# Patient Record
Sex: FEMALE | Race: WHITE | NOT HISPANIC OR LATINO | Employment: OTHER | URBAN - METROPOLITAN AREA
[De-identification: names, ages, dates, MRNs, and addresses within clinical notes are randomized per-mention and may not be internally consistent; named-entity substitution may affect disease eponyms.]

---

## 2017-01-19 ENCOUNTER — TELEPHONE (OUTPATIENT)
Dept: NURSING | Facility: CLINIC | Age: 77
End: 2017-01-19

## 2017-01-20 NOTE — TELEPHONE ENCOUNTER
"Call Type: Triage Call    Presenting Problem: \"I am having a fast heart rate . For the past 20  minutes it's running 145-148. /68. I recently had a work up  with a cardiologist because I had an abnormal EKG prior to a pre op.  But he said everything was fine .But it's beating  irregular and  fast right now.\" Patient denies other sx at this time. Triaged and  advised ER. Patient agrees.  Triage Note:  Guideline Title: Irregular Heartbeat  Recommended Disposition: See ED Immediately  Original Inclination: Wanted to speak with a nurse  Override Disposition:  Intended Action: Follow advice given  Physician Contacted: No  New onset of rapid pulse (greater than 120 beats/minute at rest) OR slow pulse  (less than 50 beats per minute at rest) ?  YES  Loss of consciousness for any period of time ? NO  New or worsening signs and symptoms that may indicate shock ? NO  Signs of dehydration and pulse rate at rest greater than 100 beats/minute ? NO  Currently having palpitations/irregular heartbeats AND severe breathing problems ?  NO  Has a pacemaker AND new symptoms of decreased cardiac output (episode of feeling  faint, dizzy or fatigue, sudden slowing of pulse, or shortness of breath) ? NO  Any other cardiac signs/symptoms for more than 5 minutes, now or within last hour.  Pain is NOT associated with taking a deep breath or a productive cough, movement,  or touch to a localized area on the chest or upper body. ? NO  Sudden onset of rapid pulse (greater than 120 beats/minute at rest) OR slow pulse  (less than 50 beats per minute at rest) with heat exposure (high temperature,  high humidity, strenuous exercise) ? NO  Physician Instructions:  Care Advice: Another adult should drive.  IMMEDIATE ACTION  "

## 2017-01-31 ENCOUNTER — DOCUMENTATION ONLY (OUTPATIENT)
Dept: OTHER | Facility: CLINIC | Age: 77
End: 2017-01-31

## 2017-01-31 DIAGNOSIS — Z71.89 ADVANCE CARE PLANNING: Primary | Chronic | ICD-10-CM

## 2018-05-22 ENCOUNTER — NURSE TRIAGE (OUTPATIENT)
Dept: NURSING | Facility: CLINIC | Age: 78
End: 2018-05-22

## 2018-05-23 NOTE — TELEPHONE ENCOUNTER
Patient reports that she is having stabbing chest pain under her left breast. Said the pain has been present for an hour. Denies feeling faint or dizzy. Cries out in pain during the triage call.     Protocol reviewed.   Caller states understanding of the recommended disposition.    Delfina Sethi RN/DONTE    Reason for Disposition    [1] Chest pain lasts > 5 minutes AND [2] age > 50    Additional Information    Negative: Severe difficulty breathing (e.g., struggling for each breath, speaks in single words)    Negative: Difficult to awaken or acting confused (e.g., disoriented, slurred speech)    Negative: Shock suspected (e.g., cold/pale/clammy skin, too weak to stand, low BP, rapid pulse)    Negative: [1] Chest pain lasts > 5 minutes AND [2] history of heart disease  (i.e., heart attack, bypass surgery, angina, angioplasty, CHF; not just a heart murmur)    Negative: [1] Chest pain lasts > 5 minutes AND [2] described as crushing, pressure-like, or heavy    Protocols used: CHEST PAIN-ADULT-

## 2018-10-18 RX ORDER — BUDESONIDE AND FORMOTEROL FUMARATE DIHYDRATE 80; 4.5 UG/1; UG/1
2 AEROSOL RESPIRATORY (INHALATION) DAILY
COMMUNITY
End: 2020-08-21

## 2018-10-18 RX ORDER — ALBUTEROL SULFATE 90 UG/1
2 AEROSOL, METERED RESPIRATORY (INHALATION) EVERY 6 HOURS PRN
COMMUNITY

## 2018-10-18 RX ORDER — ESCITALOPRAM OXALATE 10 MG/1
10 TABLET ORAL DAILY
COMMUNITY

## 2018-10-19 ENCOUNTER — HOSPITAL ENCOUNTER (OUTPATIENT)
Facility: CLINIC | Age: 78
Discharge: HOME | End: 2018-10-19
Attending: OPHTHALMOLOGY | Admitting: OPHTHALMOLOGY
Payer: MEDICARE

## 2018-10-19 ENCOUNTER — SURGERY (OUTPATIENT)
Age: 78
End: 2018-10-19

## 2018-10-19 ENCOUNTER — ANESTHESIA (OUTPATIENT)
Dept: SURGERY | Facility: CLINIC | Age: 78
End: 2018-10-19
Payer: MEDICARE

## 2018-10-19 ENCOUNTER — ANESTHESIA EVENT (OUTPATIENT)
Dept: SURGERY | Facility: CLINIC | Age: 78
End: 2018-10-19
Payer: MEDICARE

## 2018-10-19 VITALS
RESPIRATION RATE: 16 BRPM | OXYGEN SATURATION: 94 % | SYSTOLIC BLOOD PRESSURE: 113 MMHG | HEIGHT: 67 IN | BODY MASS INDEX: 25.74 KG/M2 | WEIGHT: 164 LBS | TEMPERATURE: 97 F | DIASTOLIC BLOOD PRESSURE: 58 MMHG

## 2018-10-19 PROCEDURE — 25000125 ZZHC RX 250: Performed by: ANESTHESIOLOGY

## 2018-10-19 PROCEDURE — 36000056 ZZH SURGERY LEVEL 3 1ST 30 MIN: Performed by: OPHTHALMOLOGY

## 2018-10-19 PROCEDURE — 25000132 ZZH RX MED GY IP 250 OP 250 PS 637: Mod: GY | Performed by: OPHTHALMOLOGY

## 2018-10-19 PROCEDURE — 37000008 ZZH ANESTHESIA TECHNICAL FEE, 1ST 30 MIN: Performed by: OPHTHALMOLOGY

## 2018-10-19 PROCEDURE — 71000012 ZZH RECOVERY PHASE 1 LEVEL 1 FIRST HR: Performed by: OPHTHALMOLOGY

## 2018-10-19 PROCEDURE — 27210794 ZZH OR GENERAL SUPPLY STERILE: Performed by: OPHTHALMOLOGY

## 2018-10-19 PROCEDURE — 25000125 ZZHC RX 250: Performed by: NURSE ANESTHETIST, CERTIFIED REGISTERED

## 2018-10-19 PROCEDURE — 25000125 ZZHC RX 250: Performed by: OPHTHALMOLOGY

## 2018-10-19 PROCEDURE — 25000128 H RX IP 250 OP 636: Performed by: NURSE ANESTHETIST, CERTIFIED REGISTERED

## 2018-10-19 PROCEDURE — 40000170 ZZH STATISTIC PRE-PROCEDURE ASSESSMENT II: Performed by: OPHTHALMOLOGY

## 2018-10-19 PROCEDURE — 37000009 ZZH ANESTHESIA TECHNICAL FEE, EACH ADDTL 15 MIN: Performed by: OPHTHALMOLOGY

## 2018-10-19 PROCEDURE — 25000128 H RX IP 250 OP 636: Performed by: ANESTHESIOLOGY

## 2018-10-19 PROCEDURE — 71000027 ZZH RECOVERY PHASE 2 EACH 15 MINS: Performed by: OPHTHALMOLOGY

## 2018-10-19 PROCEDURE — 36000058 ZZH SURGERY LEVEL 3 EA 15 ADDTL MIN: Performed by: OPHTHALMOLOGY

## 2018-10-19 PROCEDURE — A9270 NON-COVERED ITEM OR SERVICE: HCPCS | Mod: GY | Performed by: OPHTHALMOLOGY

## 2018-10-19 RX ORDER — HYDROMORPHONE HYDROCHLORIDE 1 MG/ML
.3-.5 INJECTION, SOLUTION INTRAMUSCULAR; INTRAVENOUS; SUBCUTANEOUS EVERY 5 MIN PRN
Status: DISCONTINUED | OUTPATIENT
Start: 2018-10-19 | End: 2018-10-19 | Stop reason: HOSPADM

## 2018-10-19 RX ORDER — ONDANSETRON 2 MG/ML
INJECTION INTRAMUSCULAR; INTRAVENOUS PRN
Status: DISCONTINUED | OUTPATIENT
Start: 2018-10-19 | End: 2018-10-19

## 2018-10-19 RX ORDER — FENTANYL CITRATE 50 UG/ML
INJECTION, SOLUTION INTRAMUSCULAR; INTRAVENOUS PRN
Status: DISCONTINUED | OUTPATIENT
Start: 2018-10-19 | End: 2018-10-19

## 2018-10-19 RX ORDER — FENTANYL CITRATE 50 UG/ML
25-50 INJECTION, SOLUTION INTRAMUSCULAR; INTRAVENOUS
Status: DISCONTINUED | OUTPATIENT
Start: 2018-10-19 | End: 2018-10-19 | Stop reason: HOSPADM

## 2018-10-19 RX ORDER — ONDANSETRON 4 MG/1
4 TABLET, ORALLY DISINTEGRATING ORAL EVERY 30 MIN PRN
Status: DISCONTINUED | OUTPATIENT
Start: 2018-10-19 | End: 2018-10-19 | Stop reason: HOSPADM

## 2018-10-19 RX ORDER — SODIUM CHLORIDE, SODIUM LACTATE, POTASSIUM CHLORIDE, CALCIUM CHLORIDE 600; 310; 30; 20 MG/100ML; MG/100ML; MG/100ML; MG/100ML
INJECTION, SOLUTION INTRAVENOUS CONTINUOUS
Status: DISCONTINUED | OUTPATIENT
Start: 2018-10-19 | End: 2018-10-19 | Stop reason: HOSPADM

## 2018-10-19 RX ORDER — ONDANSETRON 2 MG/ML
4 INJECTION INTRAMUSCULAR; INTRAVENOUS EVERY 30 MIN PRN
Status: DISCONTINUED | OUTPATIENT
Start: 2018-10-19 | End: 2018-10-19 | Stop reason: HOSPADM

## 2018-10-19 RX ORDER — PROPOFOL 10 MG/ML
INJECTION, EMULSION INTRAVENOUS PRN
Status: DISCONTINUED | OUTPATIENT
Start: 2018-10-19 | End: 2018-10-19

## 2018-10-19 RX ORDER — HYDROCODONE BITARTRATE AND ACETAMINOPHEN 5; 325 MG/1; MG/1
1 TABLET ORAL ONCE
Status: COMPLETED | OUTPATIENT
Start: 2018-10-19 | End: 2018-10-19

## 2018-10-19 RX ORDER — NALOXONE HYDROCHLORIDE 0.4 MG/ML
.1-.4 INJECTION, SOLUTION INTRAMUSCULAR; INTRAVENOUS; SUBCUTANEOUS
Status: DISCONTINUED | OUTPATIENT
Start: 2018-10-19 | End: 2018-10-19 | Stop reason: HOSPADM

## 2018-10-19 RX ORDER — LIDOCAINE HCL/EPINEPHRINE/PF 2%-1:200K
VIAL (ML) INJECTION PRN
Status: DISCONTINUED | OUTPATIENT
Start: 2018-10-19 | End: 2018-10-19 | Stop reason: HOSPADM

## 2018-10-19 RX ORDER — LIDOCAINE HYDROCHLORIDE 20 MG/ML
INJECTION, SOLUTION INFILTRATION; PERINEURAL PRN
Status: DISCONTINUED | OUTPATIENT
Start: 2018-10-19 | End: 2018-10-19

## 2018-10-19 RX ORDER — ERYTHROMYCIN 5 MG/G
OINTMENT OPHTHALMIC PRN
Status: DISCONTINUED | OUTPATIENT
Start: 2018-10-19 | End: 2018-10-19 | Stop reason: HOSPADM

## 2018-10-19 RX ADMIN — LIDOCAINE HYDROCHLORIDE 80 MG: 20 INJECTION, SOLUTION INFILTRATION; PERINEURAL at 10:48

## 2018-10-19 RX ADMIN — MIDAZOLAM 1 MG: 1 INJECTION INTRAMUSCULAR; INTRAVENOUS at 10:45

## 2018-10-19 RX ADMIN — LIDOCAINE HYDROCHLORIDE 1 ML: 10 INJECTION, SOLUTION EPIDURAL; INFILTRATION; INTRACAUDAL; PERINEURAL at 09:38

## 2018-10-19 RX ADMIN — LIDOCAINE HYDROCHLORIDE,EPINEPHRINE BITARTRATE 5 ML: 20; .005 INJECTION, SOLUTION EPIDURAL; INFILTRATION; INTRACAUDAL; PERINEURAL at 11:00

## 2018-10-19 RX ADMIN — ONDANSETRON 4 MG: 2 INJECTION INTRAMUSCULAR; INTRAVENOUS at 10:59

## 2018-10-19 RX ADMIN — PROPOFOL 10 MG: 10 INJECTION, EMULSION INTRAVENOUS at 10:51

## 2018-10-19 RX ADMIN — PROPOFOL 30 MG: 10 INJECTION, EMULSION INTRAVENOUS at 10:48

## 2018-10-19 RX ADMIN — PROPOFOL 10 MG: 10 INJECTION, EMULSION INTRAVENOUS at 10:50

## 2018-10-19 RX ADMIN — FENTANYL CITRATE 50 MCG: 50 INJECTION, SOLUTION INTRAMUSCULAR; INTRAVENOUS at 10:46

## 2018-10-19 RX ADMIN — FENTANYL CITRATE 50 MCG: 50 INJECTION, SOLUTION INTRAMUSCULAR; INTRAVENOUS at 12:10

## 2018-10-19 RX ADMIN — PROPOFOL 10 MG: 10 INJECTION, EMULSION INTRAVENOUS at 10:49

## 2018-10-19 RX ADMIN — ERYTHROMYCIN 2 G: 5 OINTMENT OPHTHALMIC at 11:00

## 2018-10-19 RX ADMIN — MIDAZOLAM 1 MG: 1 INJECTION INTRAMUSCULAR; INTRAVENOUS at 10:51

## 2018-10-19 RX ADMIN — SODIUM CHLORIDE, POTASSIUM CHLORIDE, SODIUM LACTATE AND CALCIUM CHLORIDE: 600; 310; 30; 20 INJECTION, SOLUTION INTRAVENOUS at 10:45

## 2018-10-19 RX ADMIN — FENTANYL CITRATE 25 MCG: 50 INJECTION, SOLUTION INTRAMUSCULAR; INTRAVENOUS at 11:41

## 2018-10-19 RX ADMIN — HYDROCODONE BITARTRATE AND ACETAMINOPHEN 1 TABLET: 5; 325 TABLET ORAL at 13:51

## 2018-10-19 RX ADMIN — LIDOCAINE HYDROCHLORIDE,EPINEPHRINE BITARTRATE 5 ML: 20; .005 INJECTION, SOLUTION EPIDURAL; INFILTRATION; INTRACAUDAL; PERINEURAL at 11:01

## 2018-10-19 RX ADMIN — DEXMEDETOMIDINE HYDROCHLORIDE 8 MCG: 100 INJECTION, SOLUTION INTRAVENOUS at 10:58

## 2018-10-19 RX ADMIN — DEXMEDETOMIDINE HYDROCHLORIDE 8 MCG: 100 INJECTION, SOLUTION INTRAVENOUS at 11:08

## 2018-10-19 ASSESSMENT — LIFESTYLE VARIABLES: TOBACCO_USE: 1

## 2018-10-19 ASSESSMENT — ENCOUNTER SYMPTOMS: SEIZURES: 0

## 2018-10-19 ASSESSMENT — COPD QUESTIONNAIRES: COPD: 1

## 2018-10-19 NOTE — DISCHARGE INSTRUCTIONS
Same Day Surgery Discharge Instructions for  Sedation and General Anesthesia       It's not unusual to feel dizzy, light-headed or faint for up to 24 hours after surgery or while taking pain medication.  If you have these symptoms: sit for a few minutes before standing and have someone assist you when you get up to walk or use the bathroom.      You should rest and relax for the next 24 hours. We recommend you make arrangements to have an adult stay with you for at least 24 hours after your discharge.  Avoid hazardous and strenuous activity.      DO NOT DRIVE any vehicle or operate mechanical equipment for 24 hours following the end of your surgery.  Even though you may feel normal, your reactions may be affected by the medication you have received.      Do not drink alcoholic beverages for 24 hours following surgery.       Slowly progress to your regular diet as you feel able. It's not unusual to feel nauseated and/or vomit after receiving anesthesia.  If you develop these symptoms, drink clear liquids (apple juice, ginger ale, broth, 7-up, etc. ) until you feel better.  If your nausea and vomiting persists for 24 hours, please notify your surgeon.        All narcotic pain medications, along with inactivity and anesthesia, can cause constipation. Drinking plenty of liquids and increasing fiber intake will help.      For any questions of a medical nature, call your surgeon.      Do not make important decisions for 24 hours.      If you had general anesthesia, you may have a sore throat for a couple of days related to the breathing tube used during surgery.  You may use Cepacol lozenges to help with this discomfort.  If it worsens or if you develop a fever, contact your surgeon.       If you feel your pain is not well managed with the pain medications prescribed by your surgeon, please contact your surgeon's office to let them know so they can address your concerns.     [facility]   Eyelid/Orbital  Surgery Discharge Instructions   Eric Spears M.D..     ICE COMPRESSES  Immediately following surgery, you should begin to apply ice compresses.  Apply a cold gel pack or wrap a clean washcloth around a cup of crushed ice in a plastic bag (a bag of frozen peas also works well) and hold the cold compresses directly against the closed eyelid (s).Apply cold pack for a minimum of six times daily for no longer than 15 minutes at a time. Continue cold compresses every day until the bruising and swelling begin to subside.  This can vary for each patient, but three 3 days may be common.    HOT COMPRESSES  After your swelling and bruising have begun to subside, hot compresses should be applied.  Take a clean washcloth and wring it out in hot water (as warm as you can tolerate comfortably).  Hold this warm compress against the closed eyelid(s) at least six times per day for 15 minutes.  This should be continued for about two weeks.    OINTMENT  You may be given some ointment when you leave the hospital.  Apply this ointment to the suture line(s) twice a day, 1/4 inch into the eye at bedtime for 7 days.  Expect some blurring of vision from the ointment.     ACTIVITY  Avoid heavy lifting or vigorous exercise for one week after surgery.  You may resume regular activities as tolerated.  You may shower and wash your hair on the day after surgery; be careful to avoid getting shampoo in your eyes. While your eyes are still swelling, it is recommended you sleep on your back and elevate your head with 2-3 pillows.    MEDICATION  If the doctor has given you some medications to take after surgery, please take these according to the instructions on the bottle.  Pain medications may make you drowsy so do not drive, operate heavy machinery, or use alcohol while taking it.  When you feel that you do not need the prescription pain medication, you may substitute Extra Strength Tylenol for mild pain by also following the directions on the  bottle.    If you were taking Coumadin (warfarin) prior to your surgery, you may resume this medication with your next scheduled dose.    WHAT TO EXPECT  You should expect some slight oozing of blood from the incision site over the first two to three days after surgery.  Swelling and bruising will occur for one to two weeks or longer.  You may also experience itching and tearing during the first several weeks after surgery.  This is part of the normal healing process    QUESTIONS  Please feel free to contact the office, should you have any questions that are not answered above.  The phone number is [phone].  Please call immediately if you are unable to establish vision in the operative eye, you are experiencing heavy bleeding that will not stop with gentle pressure or you have any signs of an infection (greenish/yellow discharge or progressive redness).    [facility]  [facility] [place] [address]  [place], [place] [address]  [phone]    Dr. Spears has prescribed Norco/Vicodin for pain for you. It's a combination medication of Tylenol 325 mg and Hydrocodone 5 mg.  There is a limit of 4000 mg of Tylenol per 24 hours.       You also were prescribed Erythromycin ointment.  Apply per pharmacy label instructions.

## 2018-10-19 NOTE — OP NOTE
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 Patient also has excessive skin overhanging the upper   eyelids contributing the visual obstruction.    Procedure: The patient was taken to the operating room and the upper eyelid creases were marked with a marking pen. The upper eyelids were injected with 2% Lidocaine  along both upper eyelid creases. The patient was prepped and draped in the usual sterile fashion. The planned skin incision was outlined with a fine tipped marking pen.  The incision was then made along the previously marked area. A moderate amount of skin was excised taking care to allow adequate closure and avoid lagophthalmos. Cb scissors were then used to develop a plane over the septum. The septum was opened and a small amount of orbital fat was removed. Levator aponeurosis was then disinserted from the tarsus and a plane was developed between th aponeurosis and the underlying tarsus and watters's muscle. A 5-0 Mersilene suture was then passed through the tarsus in a lamellar fashion and externalized through the levator aponeurosis. This was tied off in a temporary fashion and the patient was asked to sit up and the eyelids height and contour evaluated. This was repeated until a satisfactory height and contour were obtained,and two additional sutures were placed lateral to the initial suture.This resulted in a normal contour and height to the upper eyelids. Attempted overcorrection of 0.5mm was obtained. The redundant levator aponeurosis was then excised and the skin was then closed with running 6-0 fast absorbing suture. The patient left the operating  room in stable condition.

## 2018-10-19 NOTE — ANESTHESIA PREPROCEDURE EVALUATION
Procedure: Procedure(s):  BILATERAL UPPER LID PTOSIS AND MECHANICAL PTOSIS REPAIRS  Preop diagnosis: BILATERAL UPPER LID HERMATOCHALASIS AND MECHANICAL PTOSIS REPAIR     Allergies   Allergen Reactions     Yellow Jacket Venom [Venomil Honey Bee] Anaphylaxis     Sulfa Drugs Hives     Vancomycin Hives       Past Medical History:   Diagnosis Date     Allergic rhinitis due to allergen      Arrhythmia     tachyarrythmia     Arthritis      Asthma     states coughs alot and has been told at one time had asthma and used inhaler     Bladder dysfunction      Bladder pain      COPD (chronic obstructive pulmonary disease) (H)     mild     External hemorrhoids      Fibromyalgia      Gastro-oesophageal reflux disease      Hiatal hernia      Hiatal hernia      Lumbar radiculopathy, right      Multiple pulmonary nodules      Osteopenia      Pain      PONV (postoperative nausea and vomiting)      Ptosis      Pulmonary nodules      Urticaria      Urticaria        Past Surgical History:   Procedure Laterality Date     APPENDECTOMY       BACK SURGERY      cervical discetomy     BREAST SURGERY       C DIAG IMP MAMMO DIG BILAT, INCL CAD WHEN PERF       COLONOSCOPY       COSMETIC SURGERY  1975    breast implants     CYSTOSCOPY       CYSTOSCOPY, BIOPSY BLADDER, COMBINED  1/24/2013    Procedure: COMBINED CYSTOSCOPY, BIOPSY BLADDER;  cystoscopy, bladder biopsy and fulgaration;  Surgeon: Araceli Vega MD;  Location:  OR     CYSTOSCOPY, FULGURATE BLADDER TUMOR, COMBINED N/A 11/2/2016    Procedure: COMBINED CYSTOSCOPY, FULGURATE BLADDER TUMOR;  Surgeon: Araceli Vega MD;  Location:  OR     CYSTOSCOPY, RETROGRADES, COMBINED  1/24/2013    Procedure: COMBINED CYSTOSCOPY, RETROGRADES;  CYSTOSCOPY, BILATERAL RETROGRADES, BLADDER BIOPSY, FULGURATION ;  Surgeon: Araceli Vega MD;  Location:  OR     EYE SURGERY       TONSILLECTOMY & ADENOIDECTOMY       TUBAL LIGATION         Social History   Substance Use Topics     Smoking  status: Former Smoker     Quit date: 1/1/1973     Smokeless tobacco: Never Used     Alcohol use Yes      Comment: occasionally       Prior to Admission medications    Medication Sig Start Date End Date Taking? Authorizing Provider   albuterol (PROAIR HFA/PROVENTIL HFA/VENTOLIN HFA) 108 (90 Base) MCG/ACT inhaler Inhale 2 puffs into the lungs every 6 hours   Yes Reported, Patient   ASPIRIN PO Take 81 mg by mouth daily   Yes Reported, Patient   ATORVASTATIN CALCIUM PO Take 5 mg by mouth At Bedtime   Yes Reported, Patient   budesonide-formoterol (SYMBICORT) 80-4.5 MCG/ACT Inhaler Inhale 2 puffs into the lungs daily   Yes Reported, Patient   ESCITALOPRAM OXALATE PO Take 10 mg by mouth daily   Yes Reported, Patient   VERAPAMIL HCL PO Take 120 mg by mouth daily   Yes Reported, Patient   ALENDRONATE SODIUM PO Take 70 mg by mouth once a week    Reported, Patient   Calcium Citrate-Vitamin D (CALCIUM + D PO) Take 1 tablet by mouth daily    Reported, Patient   EPINEPHrine 0.3 MG/0.3ML injection 2-pack Inject 0.3 mg into the muscle as needed for anaphylaxis    Reported, Patient   fluticasone (VERAMYST) 27.5 MCG/SPRAY nasal spray Spray 2 sprays into both nostrils daily.    Reported, Patient   Multiple Vitamins-Minerals (MULTIVITAMIN ADULT PO) Take 1 tablet by mouth daily    Reported, Patient   OMEPRAZOLE PO Take 20 mg by mouth every morning.    Reported, Patient   pentosan polysulfate (ELMIRON) 100 MG capsule Take 100 mg by mouth 2 times daily     Reported, Patient   TRAZODONE HCL PO Take 50 mg by mouth At Bedtime    Reported, Patient   VITAMIN D, CHOLECALCIFEROL, PO Take 1,000 Units by mouth daily    Reported, Patient     No current Epic-ordered facility-administered medications on file.      No current [facility]-ordered outpatient prescriptions on file.         Wt Readings from Last 1 Encounters:   10/19/18 74.4 kg (164 lb)     Temp Readings from Last 1 Encounters:   10/19/18 36.1  C (97  F) (Oral)     BP Readings from Last 6  Encounters:   10/19/18 110/54   11/02/16 108/61   07/22/15 119/71   01/24/13 91/54     Pulse Readings from Last 4 Encounters:   07/22/15 66     Resp Readings from Last 1 Encounters:   10/19/18 16     SpO2 Readings from Last 1 Encounters:   10/19/18 95%     Recent Labs   Lab Test  07/22/15   1250   NA  138   POTASSIUM  3.9   CHLORIDE  104   CO2  28   ANIONGAP  6   GLC  102*   BUN  8   CR  0.64   BROOK  9.3     No results for input(s): AST, ALT, ALKPHOS, BILITOTAL, LIPASE in the last 28408 hours.  Recent Labs   Lab Test  07/22/15   1250   WBC  4.8   HGB  12.5   PLT  124*     No results for input(s): ABO, RH in the last 86412 hours.  No results for input(s): INR, PTT in the last 47304 hours.   No results for input(s): TROPI in the last 28105 hours.  No results for input(s): PH, PCO2, PO2, HCO3 in the last 31705 hours.  No results for input(s): HCG in the last 92200 hours.    No results found for this or any previous visit (from the past 744 hour(s)).    Anesthesia Evaluation     . Pt has had prior anesthetic.     History of anesthetic complications   - PONV        ROS/MED HX    ENT/Pulmonary:     (+)allergic rhinitis, tobacco use, Past use asthma Treatment: Inhaler daily and Inhaled steroids,  COPD, , . .   (-) sleep apnea   Neurologic:      (-) seizures, CVA and migraines   Cardiovascular:     (+) Dyslipidemia, ----. : . . . :. Irregular Heartbeat/Palpitations (Paroxysmal SVT vs a-fib per pt description; episodes of tachy to the 160's; happens approx 1x per year; controlled with verapamil), .      (-) hypertension and stent   METS/Exercise Tolerance:  >4 METS   Hematologic:        (-) history of blood clots and anemia   Musculoskeletal:        (-) arthritis   GI/Hepatic:     (+) GERD Asymptomatic on medication, hiatal hernia, hepatitis (In early adulthood; resolved with no issues) type Other,       Renal/Genitourinary:     (+) Other Renal/ Genitourinary, bladder pain   (-) renal disease   Endo:      (-) Type I DM, Type  II DM and thyroid disease   Psychiatric:         Infectious Disease:        (-) Recent Fever   Malignancy:         Other:                     Physical Exam  Normal systems: cardiovascular and pulmonary    Airway   Mallampati: II  TM distance: >3 FB  Neck ROM: full    Dental   Comment: Denies loose, damaged or chipped teeth    Cardiovascular   Rhythm and rate: regular and normal      Pulmonary                     Anesthesia Plan      History & Physical Review  History and physical reviewed and following examination; no interval change.    ASA Status:  2 .    NPO Status:  > 8 hours    Plan for MAC Reason for MAC:  Deep or markedly invasive procedure (G8)  PONV prophylaxis:  Ondansetron (or other 5HT-3)       Postoperative Care  Postoperative pain management:  IV analgesics and Oral pain medications.      Consents  Anesthetic plan, risks, benefits and alternatives discussed with:  Patient..                          .

## 2018-10-19 NOTE — IP AVS SNAPSHOT
[facility] Day [facility]    [address]    [place] [address]    Phone:  [phone]    Fax:  [phone]                                       After Visit Summary   10/19/2018    Suzanne Henderson    MRN: [id number]           After Visit Summary Signature Page     I have received my discharge instructions, and my questions have been answered. I have discussed any challenges I see with this plan with the nurse or doctor.    ..........................................................................................................................................  Patient/Patient Representative Signature      ..........................................................................................................................................  Patient Representative Print Name and Relationship to Patient    ..................................................               ................................................  Date                                   Time    ..........................................................................................................................................  Reviewed by Signature/Title    ...................................................              ..............................................  Date                                               Time          22EPIC Rev 08/18

## 2018-10-19 NOTE — ANESTHESIA POSTPROCEDURE EVALUATION
Patient: Suzanne Henderson    Procedure(s):  BILATERAL UPPER LID PTOSIS AND MECHANICAL PTOSIS REPAIRS    Diagnosis:BILATERAL UPPER LID HERMATOCHALASIS AND MECHANICAL PTOSIS REPAIR   Diagnosis Additional Information: No value filed.    Anesthesia Type:  MAC    Note:  Anesthesia Post Evaluation    Patient location during evaluation: PACU  Patient participation: Able to fully participate in evaluation  Level of consciousness: awake  Pain management: adequate  Airway patency: patent  Cardiovascular status: acceptable  Respiratory status: acceptable  Hydration status: acceptable  PONV: none             Last vitals:  Vitals:    10/19/18 0839 10/19/18 0840 10/19/18 1206   BP: 110/54  93/49   Resp: 16  16   Temp: 36.1  C (97  F)     SpO2:  95% 93%         Electronically Signed By: Uriel Schultz MD  October 19, 2018  12:15 PM

## 2018-10-19 NOTE — ANESTHESIA CARE TRANSFER NOTE
Patient: Suzanne Henderson    Procedure(s):  BILATERAL UPPER LID PTOSIS AND MECHANICAL PTOSIS REPAIRS    Diagnosis: BILATERAL UPPER LID HERMATOCHALASIS AND MECHANICAL PTOSIS REPAIR   Diagnosis Additional Information: No value filed.    Anesthesia Type:   MAC     Note:  Airway :Room Air  Patient transferred to:PACU  Comments: Pt to PACU. VSS. Report complete to RNHandoff Report: Identifed the Patient, Identified the Reponsible Provider, Reviewed the pertinent medical history, Discussed the surgical course, Reviewed Intra-OP anesthesia mangement and issues during anesthesia, Set expectations for post-procedure period and Allowed opportunity for questions and acknowledgement of understanding      Vitals: (Last set prior to Anesthesia Care Transfer)    CRNA VITALS  10/19/2018 1133 - 10/19/2018 1207      10/19/2018             Resp Rate (set): 10                Electronically Signed By: Sana Castillo CRNA, EM [place]  October 19, 2018  12:07 PM

## 2018-10-19 NOTE — OR NURSING
PNDS met, po per I&O sheet. Pt dressed, up in recliner and transported to Phase 2.   1300  Awaiting scripts that were given to Dr. Spears to fill out for pain meds and EES ointment at home.  Patient states she will have them filled at her own pharmacy.

## 2018-10-19 NOTE — IP AVS SNAPSHOT
MRN:[id number]                      After Visit Summary   10/19/2018    Suzanne Henderson    MRN: [id number]           Thank you!     Thank you for choosing [facility] for your care. Our goal is always to provide you with excellent care. Hearing back from our patients is one way we can continue to improve our services. Please take a few minutes to complete the written survey that you may receive in the mail after you visit with us. Thank you!        Patient Information     Date Of Birth          1940        About your hospital stay     You were admitted on:  October 19, 2018 [facility]    You were discharged on:  October 19, 2018       Who to Call     For medical emergencies, please call 911.  For non-urgent questions about your medical care, please call your primary care provider or clinic, [phone]  For questions related to your surgery, please call your surgery clinic        Attending Provider     Provider Specialty    Eric Spears MD Ophthalmology       Primary Care Provider Office Phone # Fax #    Dariela L Tone [phone]      After Care Instructions     Discharge Medication Instructions       Do NOT take aspirin or medications containing NSAIDS for 72 hours after procedure.            Ice to affected area       Apply cold pack for 15 minutes on, 15 minutes off, for 48 hours while awake.                  Further instructions from your care team       Same Day Surgery Discharge Instructions for  Sedation and General Anesthesia       It's not unusual to feel dizzy, light-headed or faint for up to 24 hours after surgery or while taking pain medication.  If you have these symptoms: sit for a few minutes before standing and have someone assist you when you get up to walk or use the bathroom.      You should rest and relax for the next 24 hours. We recommend you make arrangements to have an adult stay with you for  at least 24 hours after your discharge.  Avoid hazardous and strenuous activity.      DO NOT DRIVE any vehicle or operate mechanical equipment for 24 hours following the end of your surgery.  Even though you may feel normal, your reactions may be affected by the medication you have received.      Do not drink alcoholic beverages for 24 hours following surgery.       Slowly progress to your regular diet as you feel able. It's not unusual to feel nauseated and/or vomit after receiving anesthesia.  If you develop these symptoms, drink clear liquids (apple juice, ginger ale, broth, 7-up, etc. ) until you feel better.  If your nausea and vomiting persists for 24 hours, please notify your surgeon.        All narcotic pain medications, along with inactivity and anesthesia, can cause constipation. Drinking plenty of liquids and increasing fiber intake will help.      For any questions of a medical nature, call your surgeon.      Do not make important decisions for 24 hours.      If you had general anesthesia, you may have a sore throat for a couple of days related to the breathing tube used during surgery.  You may use Cepacol lozenges to help with this discomfort.  If it worsens or if you develop a fever, contact your surgeon.       If you feel your pain is not well managed with the pain medications prescribed by your surgeon, please contact your surgeon's office to let them know so they can address your concerns.     [facility]   Eyelid/Orbital Surgery Discharge Instructions   Eric Spears M.D..     ICE COMPRESSES  Immediately following surgery, you should begin to apply ice compresses.  Apply a cold gel pack or wrap a clean washcloth around a cup of crushed ice in a plastic bag (a bag of frozen peas also works well) and hold the cold compresses directly against the closed eyelid (s).Apply cold pack for a minimum of six times daily for no longer than 15 minutes at a time. Continue cold compresses every  day until the bruising and swelling begin to subside.  This can vary for each patient, but three 3 days may be common.    HOT COMPRESSES  After your swelling and bruising have begun to subside, hot compresses should be applied.  Take a clean washcloth and wring it out in hot water (as warm as you can tolerate comfortably).  Hold this warm compress against the closed eyelid(s) at least six times per day for 15 minutes.  This should be continued for about two weeks.    OINTMENT  You may be given some ointment when you leave the hospital.  Apply this ointment to the suture line(s) twice a day, 1/4 inch into the eye at bedtime for 7 days.  Expect some blurring of vision from the ointment.     ACTIVITY  Avoid heavy lifting or vigorous exercise for one week after surgery.  You may resume regular activities as tolerated.  You may shower and wash your hair on the day after surgery; be careful to avoid getting shampoo in your eyes. While your eyes are still swelling, it is recommended you sleep on your back and elevate your head with 2-3 pillows.    MEDICATION  If the doctor has given you some medications to take after surgery, please take these according to the instructions on the bottle.  Pain medications may make you drowsy so do not drive, operate heavy machinery, or use alcohol while taking it.  When you feel that you do not need the prescription pain medication, you may substitute Extra Strength Tylenol for mild pain by also following the directions on the bottle.    If you were taking Coumadin (warfarin) prior to your surgery, you may resume this medication with your next scheduled dose.    WHAT TO EXPECT  You should expect some slight oozing of blood from the incision site over the first two to three days after surgery.  Swelling and bruising will occur for one to two weeks or longer.  You may also experience itching and tearing during the first several weeks after surgery.  This is part of the normal healing  "process    QUESTIONS  Please feel free to contact the office, should you have any questions that are not answered above.  The phone number is [phone].  Please call immediately if you are unable to establish vision in the operative eye, you are experiencing heavy bleeding that will not stop with gentle pressure or you have any signs of an infection (greenish/yellow discharge or progressive redness).    [facility]  [facility]  [address]  [place], [place] [address]  [phone]    Dr. Spears has prescribed Norco/Vicodin for pain for you. It's a combination medication of Tylenol 325 mg and Hydrocodone 5 mg.  There is a limit of 4000 mg of Tylenol per 24 hours.       You also were prescribed Erythromycin ointment.  Apply per pharmacy label instructions.          Pending Results     No orders found from 10/17/2018 to 10/20/2018.            Admission Information     Date & Time Provider Department Dept. Phone    10/19/2018 Eric Spears MD [facility] Same Day Surgery [phone]      Your Vitals Were     Blood Pressure Temperature Respirations Height Weight Pulse Oximetry    92/55 97  F (36.1  C) (Oral) 16 1.702 m (5' 7\") 74.4 kg (164 lb) 92%    BMI (Body Mass Index)                   25.69 kg/m2           MyChart Information     [facility] gives you secure access to your electronic health record. If you see a primary care provider, you can also send messages to your care team and make appointments. If you have questions, please call your primary care clinic.  If you do not have a primary care provider, please call [phone] and they will assist you.        Care EveryWhere ID     This is your Care EveryWhere ID. This could be used by other organizations to access your [facility] medical records  [id number]        Equal Access to Services     MAR MALLORY : raul Calles, fran newton " yadira garzabeulah diaz'aan ah. So [facility] [phone].    ATENCIÓN: Si brendanla eb, tiene a guerra disposición servicios gratuitos de asistencia lingüística. Sabra al [phone].    We comply with applicable federal civil rights laws and [place] laws. We do not discriminate on the basis of race, color, national origin, age, disability, sex, sexual orientation, or gender identity.               Review of your medicines      CONTINUE these medicines which have NOT CHANGED        Dose / Directions    albuterol 108 (90 Base) MCG/ACT inhaler   Commonly known as:  PROAIR HFA/PROVENTIL HFA/VENTOLIN HFA        Dose:  2 puff   Inhale 2 puffs into the lungs every 6 hours   Refills:  0       ALENDRONATE SODIUM PO        Dose:  70 mg   Take 70 mg by mouth once a week   Refills:  0       ATORVASTATIN CALCIUM PO        Dose:  5 mg   Take 5 mg by mouth At Bedtime   Refills:  0       budesonide-formoterol 80-4.5 MCG/ACT Inhaler   Commonly known as:  SYMBICORT        Dose:  2 puff   Inhale 2 puffs into the lungs daily   Refills:  0       CALCIUM + D PO        Dose:  1 tablet   Take 1 tablet by mouth daily   Refills:  0       EPINEPHrine 0.3 MG/0.3ML injection 2-pack   Commonly known as:  EPIPEN/ADRENACLICK/or ANY BX GENERIC EQUIV        Dose:  0.3 mg   Inject 0.3 mg into the muscle as needed for anaphylaxis   Refills:  0       ESCITALOPRAM OXALATE PO        Dose:  10 mg   Take 10 mg by mouth daily   Refills:  0       fluticasone 27.5 MCG/SPRAY spray   Commonly known as:  VERAMYST        Dose:  2 spray   Spray 2 sprays into both nostrils daily.   Refills:  0       MULTIVITAMIN ADULT PO        Dose:  1 tablet   Take 1 tablet by mouth daily   Refills:  0       OMEPRAZOLE PO        Dose:  20 mg   Take 20 mg by mouth every morning.   Refills:  0       pentosan polysulfate 100 MG capsule   Commonly known as:  ELMIRON        Dose:  100 mg   Take 100 mg by mouth 2 times daily   Refills:  0       TRAZODONE HCL PO         Dose:  50 mg   Take 50 mg by mouth At Bedtime   Refills:  0       VERAPAMIL HCL PO        Dose:  120 mg   Take 120 mg by mouth daily   Refills:  0       VITAMIN D (CHOLECALCIFEROL) PO        Dose:  1000 Units   Take 1,000 Units by mouth daily   Refills:  0         STOP taking     ASPIRIN PO                    Protect others around you: Learn how to safely use, store and throw away your medicines at www.disposemymeds.org.             Medication List: This is a list of all your medications and when to take them. Check marks below indicate your daily home schedule. Keep this list as a reference.      Medications           Morning Afternoon Evening Bedtime As Needed    albuterol 108 (90 Base) MCG/ACT inhaler   Commonly known as:  PROAIR HFA/PROVENTIL HFA/VENTOLIN HFA   Inhale 2 puffs into the lungs every 6 hours                                ALENDRONATE SODIUM PO   Take 70 mg by mouth once a week                                ATORVASTATIN CALCIUM PO   Take 5 mg by mouth At Bedtime                                budesonide-formoterol 80-4.5 MCG/ACT Inhaler   Commonly known as:  SYMBICORT   Inhale 2 puffs into the lungs daily                                CALCIUM + D PO   Take 1 tablet by mouth daily                                EPINEPHrine 0.3 MG/0.3ML injection 2-pack   Commonly known as:  EPIPEN/ADRENACLICK/or ANY BX GENERIC EQUIV   Inject 0.3 mg into the muscle as needed for anaphylaxis                                ESCITALOPRAM OXALATE PO   Take 10 mg by mouth daily                                fluticasone 27.5 MCG/SPRAY spray   Commonly known as:  VERAMYST   Spray 2 sprays into both nostrils daily.                                MULTIVITAMIN ADULT PO   Take 1 tablet by mouth daily                                OMEPRAZOLE PO   Take 20 mg by mouth every morning.                                pentosan polysulfate 100 MG capsule   Commonly known as:  ELMIRON   Take 100 mg by mouth 2 times daily                                 TRAZODONE HCL PO   Take 50 mg by mouth At Bedtime                                VERAPAMIL HCL PO   Take 120 mg by mouth daily                                VITAMIN D (CHOLECALCIFEROL) PO   Take 1,000 Units by mouth daily

## 2019-01-29 RX ORDER — AZELASTINE 1 MG/ML
1 SPRAY, METERED NASAL DAILY
COMMUNITY
End: 2020-08-21

## 2019-01-29 RX ORDER — ASPIRIN 81 MG/1
81 TABLET ORAL DAILY
Status: ON HOLD | COMMUNITY
End: 2019-01-30

## 2019-01-30 ENCOUNTER — HOSPITAL ENCOUNTER (OUTPATIENT)
Facility: CLINIC | Age: 79
Discharge: HOME | End: 2019-01-30
Attending: UROLOGY | Admitting: UROLOGY
Payer: MEDICARE

## 2019-01-30 ENCOUNTER — ANESTHESIA (OUTPATIENT)
Dept: SURGERY | Facility: CLINIC | Age: 79
End: 2019-01-30
Payer: MEDICARE

## 2019-01-30 ENCOUNTER — ANESTHESIA EVENT (OUTPATIENT)
Dept: SURGERY | Facility: CLINIC | Age: 79
End: 2019-01-30
Payer: MEDICARE

## 2019-01-30 VITALS
OXYGEN SATURATION: 97 % | BODY MASS INDEX: 26.01 KG/M2 | SYSTOLIC BLOOD PRESSURE: 103 MMHG | TEMPERATURE: 97.9 F | WEIGHT: 165.7 LBS | DIASTOLIC BLOOD PRESSURE: 62 MMHG | HEART RATE: 76 BPM | HEIGHT: 67 IN | RESPIRATION RATE: 14 BRPM

## 2019-01-30 DIAGNOSIS — Z71.89 ADVANCE CARE PLANNING: Primary | Chronic | ICD-10-CM

## 2019-01-30 PROCEDURE — 40000170 ZZH STATISTIC PRE-PROCEDURE ASSESSMENT II: Performed by: UROLOGY

## 2019-01-30 PROCEDURE — 36000052 ZZH SURGERY LEVEL 2 EA 15 ADDTL MIN: Performed by: UROLOGY

## 2019-01-30 PROCEDURE — 71000013 ZZH RECOVERY PHASE 1 LEVEL 1 EA ADDTL HR: Performed by: UROLOGY

## 2019-01-30 PROCEDURE — 71000027 ZZH RECOVERY PHASE 2 EACH 15 MINS: Performed by: UROLOGY

## 2019-01-30 PROCEDURE — 88112 CYTOPATH CELL ENHANCE TECH: CPT | Performed by: UROLOGY

## 2019-01-30 PROCEDURE — 25000125 ZZHC RX 250: Performed by: NURSE ANESTHETIST, CERTIFIED REGISTERED

## 2019-01-30 PROCEDURE — 25000128 H RX IP 250 OP 636: Performed by: UROLOGY

## 2019-01-30 PROCEDURE — 37000009 ZZH ANESTHESIA TECHNICAL FEE, EACH ADDTL 15 MIN: Performed by: UROLOGY

## 2019-01-30 PROCEDURE — 36000050 ZZH SURGERY LEVEL 2 1ST 30 MIN: Performed by: UROLOGY

## 2019-01-30 PROCEDURE — 88112 CYTOPATH CELL ENHANCE TECH: CPT | Mod: 26 | Performed by: UROLOGY

## 2019-01-30 PROCEDURE — 71000012 ZZH RECOVERY PHASE 1 LEVEL 1 FIRST HR: Performed by: UROLOGY

## 2019-01-30 PROCEDURE — 25000128 H RX IP 250 OP 636: Performed by: ANESTHESIOLOGY

## 2019-01-30 PROCEDURE — 25000566 ZZH SEVOFLURANE, EA 15 MIN: Performed by: UROLOGY

## 2019-01-30 PROCEDURE — 37000008 ZZH ANESTHESIA TECHNICAL FEE, 1ST 30 MIN: Performed by: UROLOGY

## 2019-01-30 PROCEDURE — 27210794 ZZH OR GENERAL SUPPLY STERILE: Performed by: UROLOGY

## 2019-01-30 PROCEDURE — A9270 NON-COVERED ITEM OR SERVICE: HCPCS | Mod: GY | Performed by: UROLOGY

## 2019-01-30 PROCEDURE — 25000132 ZZH RX MED GY IP 250 OP 250 PS 637: Mod: GY | Performed by: UROLOGY

## 2019-01-30 PROCEDURE — 25000125 ZZHC RX 250: Performed by: UROLOGY

## 2019-01-30 PROCEDURE — 88305 TISSUE EXAM BY PATHOLOGIST: CPT | Mod: 26 | Performed by: UROLOGY

## 2019-01-30 PROCEDURE — 88305 TISSUE EXAM BY PATHOLOGIST: CPT | Performed by: UROLOGY

## 2019-01-30 PROCEDURE — 25000128 H RX IP 250 OP 636: Performed by: NURSE ANESTHETIST, CERTIFIED REGISTERED

## 2019-01-30 RX ORDER — HYDROCODONE BITARTRATE AND ACETAMINOPHEN 5; 325 MG/1; MG/1
1 TABLET ORAL ONCE
Status: COMPLETED | OUTPATIENT
Start: 2019-01-30 | End: 2019-01-30

## 2019-01-30 RX ORDER — HYDROMORPHONE HYDROCHLORIDE 1 MG/ML
.3-.5 INJECTION, SOLUTION INTRAMUSCULAR; INTRAVENOUS; SUBCUTANEOUS EVERY 10 MIN PRN
Status: DISCONTINUED | OUTPATIENT
Start: 2019-01-30 | End: 2019-01-30 | Stop reason: HOSPADM

## 2019-01-30 RX ORDER — CIPROFLOXACIN 500 MG/1
500 TABLET, FILM COATED ORAL 2 TIMES DAILY
Qty: 6 TABLET | Refills: 0 | Status: SHIPPED | OUTPATIENT
Start: 2019-01-30 | End: 2019-02-02

## 2019-01-30 RX ORDER — FENTANYL CITRATE 50 UG/ML
25-50 INJECTION, SOLUTION INTRAMUSCULAR; INTRAVENOUS
Status: DISCONTINUED | OUTPATIENT
Start: 2019-01-30 | End: 2019-01-30 | Stop reason: HOSPADM

## 2019-01-30 RX ORDER — DEXAMETHASONE SODIUM PHOSPHATE 4 MG/ML
INJECTION, SOLUTION INTRA-ARTICULAR; INTRALESIONAL; INTRAMUSCULAR; INTRAVENOUS; SOFT TISSUE PRN
Status: DISCONTINUED | OUTPATIENT
Start: 2019-01-30 | End: 2019-01-30

## 2019-01-30 RX ORDER — FENTANYL CITRATE 50 UG/ML
50-100 INJECTION, SOLUTION INTRAMUSCULAR; INTRAVENOUS
Status: DISCONTINUED | OUTPATIENT
Start: 2019-01-30 | End: 2019-01-30 | Stop reason: HOSPADM

## 2019-01-30 RX ORDER — LIDOCAINE HYDROCHLORIDE 20 MG/ML
INJECTION, SOLUTION INFILTRATION; PERINEURAL PRN
Status: DISCONTINUED | OUTPATIENT
Start: 2019-01-30 | End: 2019-01-30

## 2019-01-30 RX ORDER — ONDANSETRON 2 MG/ML
4 INJECTION INTRAMUSCULAR; INTRAVENOUS EVERY 30 MIN PRN
Status: DISCONTINUED | OUTPATIENT
Start: 2019-01-30 | End: 2019-01-30 | Stop reason: HOSPADM

## 2019-01-30 RX ORDER — ONDANSETRON 4 MG/1
4 TABLET, ORALLY DISINTEGRATING ORAL EVERY 30 MIN PRN
Status: DISCONTINUED | OUTPATIENT
Start: 2019-01-30 | End: 2019-01-30 | Stop reason: HOSPADM

## 2019-01-30 RX ORDER — ONDANSETRON 2 MG/ML
INJECTION INTRAMUSCULAR; INTRAVENOUS PRN
Status: DISCONTINUED | OUTPATIENT
Start: 2019-01-30 | End: 2019-01-30

## 2019-01-30 RX ORDER — SODIUM CHLORIDE, SODIUM LACTATE, POTASSIUM CHLORIDE, CALCIUM CHLORIDE 600; 310; 30; 20 MG/100ML; MG/100ML; MG/100ML; MG/100ML
INJECTION, SOLUTION INTRAVENOUS CONTINUOUS PRN
Status: DISCONTINUED | OUTPATIENT
Start: 2019-01-30 | End: 2019-01-30

## 2019-01-30 RX ORDER — MEPERIDINE HYDROCHLORIDE 25 MG/ML
12.5 INJECTION INTRAMUSCULAR; INTRAVENOUS; SUBCUTANEOUS
Status: DISCONTINUED | OUTPATIENT
Start: 2019-01-30 | End: 2019-01-30 | Stop reason: HOSPADM

## 2019-01-30 RX ORDER — HYDROCODONE BITARTRATE AND ACETAMINOPHEN 5; 325 MG/1; MG/1
2 TABLET ORAL ONCE
Status: DISCONTINUED | OUTPATIENT
Start: 2019-01-30 | End: 2019-01-30 | Stop reason: HOSPADM

## 2019-01-30 RX ORDER — ASPIRIN 81 MG/1
81 TABLET ORAL DAILY
Qty: 30 TABLET | Refills: 0
Start: 2019-02-04 | End: 2019-03-06

## 2019-01-30 RX ORDER — HYDROCODONE BITARTRATE AND ACETAMINOPHEN 5; 325 MG/1; MG/1
1 TABLET ORAL EVERY 6 HOURS PRN
Qty: 18 TABLET | Refills: 0 | Status: SHIPPED | OUTPATIENT
Start: 2019-01-30 | End: 2019-02-17

## 2019-01-30 RX ORDER — PROPOFOL 10 MG/ML
INJECTION, EMULSION INTRAVENOUS PRN
Status: DISCONTINUED | OUTPATIENT
Start: 2019-01-30 | End: 2019-01-30

## 2019-01-30 RX ORDER — CIPROFLOXACIN 2 MG/ML
400 INJECTION, SOLUTION INTRAVENOUS
Status: COMPLETED | OUTPATIENT
Start: 2019-01-30 | End: 2019-01-30

## 2019-01-30 RX ORDER — SODIUM CHLORIDE, SODIUM LACTATE, POTASSIUM CHLORIDE, CALCIUM CHLORIDE 600; 310; 30; 20 MG/100ML; MG/100ML; MG/100ML; MG/100ML
INJECTION, SOLUTION INTRAVENOUS CONTINUOUS
Status: DISCONTINUED | OUTPATIENT
Start: 2019-01-30 | End: 2019-01-30 | Stop reason: HOSPADM

## 2019-01-30 RX ORDER — NALOXONE HYDROCHLORIDE 0.4 MG/ML
.1-.4 INJECTION, SOLUTION INTRAMUSCULAR; INTRAVENOUS; SUBCUTANEOUS
Status: DISCONTINUED | OUTPATIENT
Start: 2019-01-30 | End: 2019-01-30 | Stop reason: HOSPADM

## 2019-01-30 RX ORDER — PROPOFOL 10 MG/ML
INJECTION, EMULSION INTRAVENOUS CONTINUOUS PRN
Status: DISCONTINUED | OUTPATIENT
Start: 2019-01-30 | End: 2019-01-30

## 2019-01-30 RX ADMIN — SODIUM CHLORIDE, POTASSIUM CHLORIDE, SODIUM LACTATE AND CALCIUM CHLORIDE: 600; 310; 30; 20 INJECTION, SOLUTION INTRAVENOUS at 10:26

## 2019-01-30 RX ADMIN — PHENYLEPHRINE HYDROCHLORIDE 100 MCG: 10 INJECTION, SOLUTION INTRAMUSCULAR; INTRAVENOUS; SUBCUTANEOUS at 10:59

## 2019-01-30 RX ADMIN — MIDAZOLAM 2 MG: 1 INJECTION INTRAMUSCULAR; INTRAVENOUS at 10:28

## 2019-01-30 RX ADMIN — PROPOFOL 150 MG: 10 INJECTION, EMULSION INTRAVENOUS at 10:28

## 2019-01-30 RX ADMIN — PROPOFOL 50 MCG/KG/MIN: 10 INJECTION, EMULSION INTRAVENOUS at 10:32

## 2019-01-30 RX ADMIN — HYDROCODONE BITARTRATE AND ACETAMINOPHEN 1 TABLET: 5; 325 TABLET ORAL at 12:45

## 2019-01-30 RX ADMIN — DEXAMETHASONE SODIUM PHOSPHATE 4 MG: 4 INJECTION, SOLUTION INTRA-ARTICULAR; INTRALESIONAL; INTRAMUSCULAR; INTRAVENOUS; SOFT TISSUE at 10:37

## 2019-01-30 RX ADMIN — ONDANSETRON 4 MG: 2 INJECTION INTRAMUSCULAR; INTRAVENOUS at 10:37

## 2019-01-30 RX ADMIN — DEXMEDETOMIDINE HYDROCHLORIDE 8 MCG: 100 INJECTION, SOLUTION INTRAVENOUS at 10:42

## 2019-01-30 RX ADMIN — SUCCINYLCHOLINE CHLORIDE 80 MG: 20 INJECTION, SOLUTION INTRAMUSCULAR; INTRAVENOUS; PARENTERAL at 10:28

## 2019-01-30 RX ADMIN — PROPOFOL 50 MG: 10 INJECTION, EMULSION INTRAVENOUS at 10:35

## 2019-01-30 RX ADMIN — LIDOCAINE HYDROCHLORIDE 100 MG: 20 INJECTION, SOLUTION INFILTRATION; PERINEURAL at 10:28

## 2019-01-30 RX ADMIN — SODIUM CHLORIDE, POTASSIUM CHLORIDE, SODIUM LACTATE AND CALCIUM CHLORIDE: 600; 310; 30; 20 INJECTION, SOLUTION INTRAVENOUS at 12:13

## 2019-01-30 RX ADMIN — PROPOFOL 60 MG: 10 INJECTION, EMULSION INTRAVENOUS at 10:41

## 2019-01-30 RX ADMIN — PHENYLEPHRINE HYDROCHLORIDE 100 MCG: 10 INJECTION, SOLUTION INTRAMUSCULAR; INTRAVENOUS; SUBCUTANEOUS at 10:52

## 2019-01-30 RX ADMIN — PROPOFOL 40 MG: 10 INJECTION, EMULSION INTRAVENOUS at 10:36

## 2019-01-30 RX ADMIN — CIPROFLOXACIN 400 MG: 2 INJECTION INTRAVENOUS at 10:32

## 2019-01-30 RX ADMIN — FENTANYL CITRATE 100 MCG: 50 INJECTION, SOLUTION INTRAMUSCULAR; INTRAVENOUS at 10:28

## 2019-01-30 ASSESSMENT — LIFESTYLE VARIABLES: TOBACCO_USE: 0

## 2019-01-30 ASSESSMENT — COPD QUESTIONNAIRES
COPD: 1
CAT_SEVERITY: MILD

## 2019-01-30 ASSESSMENT — ENCOUNTER SYMPTOMS: SEIZURES: 0

## 2019-01-30 ASSESSMENT — MIFFLIN-ST. JEOR: SCORE: 1274.24

## 2019-01-30 NOTE — DISCHARGE INSTRUCTIONS
Same Day Surgery Discharge Instructions for  Sedation and General Anesthesia       It's not unusual to feel dizzy, light-headed or faint for up to 24 hours after surgery or while taking pain medication.  If you have these symptoms: sit for a few minutes before standing and have someone assist you when you get up to walk or use the bathroom.      You should rest and relax for the next 24 hours. We recommend you make arrangements to have an adult stay with you for at least 24 hours after your discharge.  Avoid hazardous and strenuous activity.      DO NOT DRIVE any vehicle or operate mechanical equipment for 24 hours following the end of your surgery.  Even though you may feel normal, your reactions may be affected by the medication you have received.      Do not drink alcoholic beverages for 24 hours following surgery.       Slowly progress to your regular diet as you feel able. It's not unusual to feel nauseated and/or vomit after receiving anesthesia.  If you develop these symptoms, drink clear liquids (apple juice, ginger ale, broth, 7-up, etc. ) until you feel better.  If your nausea and vomiting persists for 24 hours, please notify your surgeon.        All narcotic pain medications, along with inactivity and anesthesia, can cause constipation. Drinking plenty of liquids and increasing fiber intake will help.      For any questions of a medical nature, call your surgeon.      Do not make important decisions for 24 hours.      If you had general anesthesia, you may have a sore throat for a couple of days related to the breathing tube used during surgery.  You may use Cepacol lozenges to help with this discomfort.  If it worsens or if you develop a fever, contact your surgeon.       If you feel your pain is not well managed with the pain medications prescribed by your surgeon, please contact your surgeon's office to let them know so they can address your concerns.       **If you have questions or concerns  about your procedure,   call Dr. Vega at [phone]**            Cystoscopy Discharge Instructions      Diet:    Return to the diet that you were on before the procedure, unless you are given specific diet instructions.    It is important to drink 6-8 glasses of fluids per day at home - at least 3-4 glasses should be water.    Activity:    Walk short distances and increase as your strength allows.    You may climb stairs.    Do not do strenuous exercise or heavy lifting until approved by surgeon.    Do not drive while taking narcotic pain medications.    Bathing:    You may take a shower.    Call your physician if these signs/symptoms are present:    Pain that is not relieved by a short rest or ordered pain medications.    Temperature at or above 101.0 F or chills.    Inability or difficulty urinating.  Excessive blood in urine.    Any questions or concerns.

## 2019-01-30 NOTE — ANESTHESIA CARE TRANSFER NOTE
Patient: Suzanne Henderson    Procedure(s):  COMBINED CYSTOSCOPY,  BLADDER HYSRODISTENTION FULGURATION OF  BLADDER ULCERS , BLADDER WAASHING, BLADDER BIOPSY    Diagnosis: CHRONIC ITERSTICHAL CYSTITIIS   Diagnosis Additional Information: No value filed.    Anesthesia Type:   General, ETT     Note:  Airway :Face Mask  Patient transferred to:PACU  Handoff Report: Identifed the Patient, Identified the Reponsible Provider, Reviewed the pertinent medical history, Discussed the surgical course, Reviewed Intra-OP anesthesia mangement and issues during anesthesia, Set expectations for post-procedure period and Allowed opportunity for questions and acknowledgement of understanding      Vitals: (Last set prior to Anesthesia Care Transfer)    CRNA VITALS  1/30/2019 1045 - 1/30/2019 1123      1/30/2019             NIBP:  139/75    Pulse:  94    NIBP Mean:  98    Ht Rate:  93    SpO2:  96 %    Resp Rate (observed):  4  (Abnormal)                 Electronically Signed By: EM Orourke CRNA  January 30, 2019  11:23 AM

## 2019-01-30 NOTE — ANESTHESIA POSTPROCEDURE EVALUATION
Patient: Suzanne Henderson    Procedure(s):  COMBINED CYSTOSCOPY,  BLADDER HYSRODISTENTION FULGURATION OF  BLADDER ULCERS , BLADDER WAASHING, BLADDER BIOPSY    Diagnosis:CHRONIC ITERSTICHAL CYSTITIIS   Diagnosis Additional Information: No value filed.    Anesthesia Type:  General, ETT    Note:  Anesthesia Post Evaluation    Patient location during evaluation: PACU  Patient participation: Able to fully participate in evaluation  Level of consciousness: awake and alert  Pain management: adequate  Airway patency: patent  Cardiovascular status: acceptable  Respiratory status: acceptable  Hydration status: acceptable  PONV: none     Anesthetic complications: None          Last vitals:  Vitals:    01/30/19 1230 01/30/19 1245 01/30/19 1405   BP: 100/58 99/61 103/62   Pulse: 61 76    Resp: 10 19 14   Temp: 36.5  C (97.7  F) 36.6  C (97.9  F)    SpO2: 94% 93% 97%         Electronically Signed By: Jose Roberto Curry MD  January 30, 2019  2:17 PM

## 2019-01-30 NOTE — BRIEF OP NOTE
[facility] Brief Operative Note    Pre-operative diagnosis: CHRONIC ITERSTICHAL CYSTITIIS    Post-operative diagnosis Same    Procedure: Procedure(s):  COMBINED CYSTOSCOPY,  BLADDER HYSRODISTENTION FULGURATION OF  BLADDER ULCERS , BLADDER WAASHING, BLADDER BIOPSY   Surgeon: Araceli Vega MD   Assistants(s): none   Estimated blood loss: Minimal    Specimens: 1. Bladder washings 2. Bladder biopsy   Findings: 1 cm ulcer at the posterior bladder dome; 700 ml bladder capacity

## 2019-01-30 NOTE — ANESTHESIA PREPROCEDURE EVALUATION
Anesthesia Pre-Procedure Evaluation    Patient: Suzanne Henderson   MRN: [id number] : 1940          Preoperative Diagnosis: CHRONIC ITERSTICHAL CYSTITIIS     Procedure(s):  COMBINED CYSTOSCOPY,  BLADDER HYSRODISTENTION FULGURATION OF  BLADDER ULCERS    Past Medical History:   Diagnosis Date     Allergic rhinitis due to allergen     Dust, Ragweed     Arrhythmia     tachyarrythmia     Arthritis      Asthma     states coughs alot and has been told at one time had asthma and used inhaler     Bladder dysfunction      Bladder pain      Chronic interstitial cystitis      Colon polyps      COPD (chronic obstructive pulmonary disease) (H)     mild     External hemorrhoids      Fibromyalgia      Gastro-oesophageal reflux disease      GERD (gastroesophageal reflux disease)      Hiatal hernia      Hiatal hernia      Lumbar radiculopathy, right      Multiple pulmonary nodules      Osteopenia      Pain      PONV (postoperative nausea and vomiting)      Ptosis      Pulmonary nodules      Sleep apnea      Tachycardia      Urticaria      Past Surgical History:   Procedure Laterality Date     APPENDECTOMY       BACK SURGERY      cervical discetomy     BREAST SURGERY       C DIAG IMP MAMMO DIG BILAT, INCL CAD WHEN PERF       COLONOSCOPY       COSMETIC SURGERY  1975    breast implants     CYSTOSCOPY       CYSTOSCOPY, BIOPSY BLADDER, COMBINED  2013    Procedure: COMBINED CYSTOSCOPY, BIOPSY BLADDER;  cystoscopy, bladder biopsy and fulgaration;  Surgeon: Araceli Vega MD;  Location:  OR     CYSTOSCOPY, FULGURATE BLADDER TUMOR, COMBINED N/A 2016    Procedure: COMBINED CYSTOSCOPY, FULGURATE BLADDER TUMOR;  Surgeon: Araceli Vega MD;  Location:  OR     CYSTOSCOPY, RETROGRADES, COMBINED  2013    Procedure: COMBINED CYSTOSCOPY, RETROGRADES;  CYSTOSCOPY, BILATERAL RETROGRADES, BLADDER BIOPSY, FULGURATION ;  Surgeon: Araceli Vega MD;  Location:  OR     EYE SURGERY       REPAIR PTOSIS BILATERAL  Bilateral 10/19/2018    Procedure: BILATERAL UPPER LID PTOSIS AND MECHANICAL PTOSIS REPAIRS;  Surgeon: Eric Spears MD;  Location: [facility]     TONSILLECTOMY & ADENOIDECTOMY       TUBAL LIGATION         Anesthesia Evaluation     . Pt has had prior anesthetic.     History of anesthetic complications   - PONV        ROS/MED HX    ENT/Pulmonary:     (+)sleep apnea, Intermittent asthma Treatment: Inhaler prn,  mild COPD, uses CPAP , . .   (-) tobacco use   Neurologic: Comment: Lumbar radiculopathy     (-) seizures, CVA, TIA and migraines   Cardiovascular:        (-) hypertension and CAD   METS/Exercise Tolerance:     Hematologic:     (+) Other Hematologic Disorder-thrombocytopenia      Musculoskeletal:         GI/Hepatic:     (+) GERD Symptomatic, hiatal hernia,      (-) liver disease   Renal/Genitourinary:     (+) Other Renal/ Genitourinary, interstitial cystitis and bladder ulcers   (-) renal disease   Endo:      (-) Type I DM and Type II DM   Psychiatric:         Infectious Disease:         Malignancy:         Other:                          Physical Exam  Normal systems: cardiovascular and pulmonary    Airway   Mallampati: I  TM distance: >3 FB  Neck ROM: full    Dental   (+) chipped  Comment: Many cracked teeth, one capped    Cardiovascular       Pulmonary             Lab Results   Component Value Date    WBC 4.8 07/22/2015    HGB 12.5 07/22/2015    HCT 37.0 07/22/2015     (L) 07/22/2015     07/22/2015    POTASSIUM 3.9 07/22/2015    CHLORIDE 104 07/22/2015    CO2 28 07/22/2015    BUN 8 07/22/2015    CR 0.64 07/22/2015     (H) 07/22/2015    BROOK 9.3 07/22/2015       Preop Vitals  BP Readings from Last 3 Encounters:   01/30/19 120/56   10/19/18 113/58   11/02/16 108/61    Pulse Readings from Last 3 Encounters:   07/22/15 66      Resp Readings from Last 3 Encounters:   01/30/19 16   10/19/18 16   11/02/16 12    SpO2 Readings from Last 3 Encounters:   01/30/19 94%   10/19/18 94%   11/02/16 96%  "     Temp Readings from Last 1 Encounters:   01/30/19 35.8  C (96.4  F) (Oral)    Ht Readings from Last 1 Encounters:   01/30/19 1.702 m (5' 7\")      Wt Readings from Last 1 Encounters:   01/30/19 75.2 kg (165 lb 11.2 oz)    Estimated body mass index is 25.95 kg/m  as calculated from the following:    Height as of this encounter: 1.702 m (5' 7\").    Weight as of this encounter: 75.2 kg (165 lb 11.2 oz).       Anesthesia Plan      History & Physical Review  History and physical reviewed and following examination; no interval change.    ASA Status:  3 .    NPO Status:  > 8 hours    Plan for General and ETT with Intravenous induction. Maintenance will be Balanced.    PONV prophylaxis:  Ondansetron (or other 5HT-3) and Dexamethasone or Solumedrol       Postoperative Care  Postoperative pain management:  IV analgesics.      Consents  Anesthetic plan, risks, benefits and alternatives discussed with:  Patient..                 Jose Roberto Curry MD  "

## 2019-01-31 LAB — COPATH REPORT: NORMAL

## 2019-01-31 NOTE — OP NOTE
Procedure Date: 01/30/2019      PREOPERATIVE DIAGNOSIS:  History of interstitial cystitis, poorly controlled with medical management.      POSTOPERATIVE DIAGNOSIS:  History of interstitial cystitis, poorly controlled with medical management.      PROCEDURE:  Cystoscopy, hydrodistention, and fulguration of the bladder with biopsy of the bladder.      SPECIMENS:  Bladder biopsy and urine for cytology.      ESTIMATED BLOOD LOSS:  Minimal.      COMPLICATIONS:  None.      INDICATIONS FOR PROCEDURE:  Millicent is a 78-year-old female with worsening of interstitial cystitis symptoms who also underwent fulguration of the ulcer a couple of years ago.  At this time, she presents with worsening of her IC and poor control with medical management.  She would like to proceed with hydrodistention of her bladder and fulguration as needed.  She understands the risks of the procedure including bleeding, infection, worsening of her symptoms, especially initially, bleeding, infection, need for additional procedures.  She would like to proceed.      DETAILS OF THE PROCEDURE:  Suzanne was brought to the operating room, placed in supine position.  After excellent induction of general anesthesia, her perineum was prepped and draped in the regular fashion.  A 22-French cystoscope was placed per urethra.  Evaluation of the bladder did demonstrate 1 cm ulcer at the posterior dome of the bladder.  I gently hydrodistended the patient's bladder with the pressure 120 cm above the bladder.  She was able to accommodate 700 mL as of the largest bladder capacity.  She developed terminal hematuria with bleeding primarily around the ulcer site.  I took a cold cut biopsy of that ulcer and thoroughly fulgurated the edges of the biopsy.  That achieved excellent hemostasis.  The bladder was drained and the patient was transferred to the recovery area in stable condition.  Of note, I also performed a bladder washings prior to the biopsy, and the urine from that  was sent for cytology.        The plan as of now is to see Ian in a 1 or 2 month interval to check on her symptoms and follow up on her pathology results in 1-week interval.         JUAN MICHAUD MD             D: 2019   T: 2019   MT: KE      Name:     IAN EATON   MRN:      [id number]        Account:        [id number]   :      1940           Procedure Date: 2019      Document: [id number]       cc: Juan Michaud MD

## 2019-02-01 LAB — COPATH REPORT: NORMAL

## 2019-04-23 ENCOUNTER — HOSPITAL ENCOUNTER (OUTPATIENT)
Dept: SPEECH THERAPY | Facility: CLINIC | Age: 79
Setting detail: THERAPIES SERIES
End: 2019-04-23
Attending: ALLERGY & IMMUNOLOGY
Payer: MEDICARE

## 2019-04-23 PROCEDURE — 92524 BEHAVRAL QUALIT ANALYS VOICE: CPT | Mod: GN | Performed by: STUDENT IN AN ORGANIZED HEALTH CARE EDUCATION/TRAINING PROGRAM

## 2019-04-23 PROCEDURE — 31579 LARYNGOSCOPY TELESCOPIC: CPT | Performed by: STUDENT IN AN ORGANIZED HEALTH CARE EDUCATION/TRAINING PROGRAM

## 2019-05-02 NOTE — ADDENDUM NOTE
Encounter addended by: Alpers, Allison E, SLP on: 5/2/2019 9:14 AM   Actions taken: Flowsheet accepted

## 2019-05-06 NOTE — PROGRESS NOTES
[facility]          OUTPATIENT SPEECH LANGUAGE PATHOLOGY VOICE EVALUATION  PLAN OF TREATMENT FOR OUTPATIENT REHABILITATION  (COMPLETE FOR INITIAL CLAIMS ONLY)    Patient's Last Name, First Name, M.I.  YOB: 1940  Suzanne Henderson                           Provider s Name: [facility] Medical Record No.  [id number]     Onset Date:  3/20/2019 (order date)    Start of Care Date:  4/23/2019   Type:     ___PT  __OT   _X_SLP    Medical Diagnosis: Chronic cough, Dysphonia   Speech Language Pathology Diagnosis:  Chronic cough, Dysphonia    Visits from SOC: 1      _________________________________________________________________________________  Plan of Treatment/Functional Goals:  Voice         Goals     1. Goal Identifier: Cough       Goal Description: Patient will learn, demonstrate, and implement at least 3 cough/throat clear reduction strategies (i.e. alternative cough/throat clear techniques, breathing techniques to arrest cough, management of triggers) in order to report a week of typical activities in which the cough does not exceed a level of 3 out of 10, 80% of the time.       Target Date: 07/23/19   2. Goal Identifier: Hygiene       Goal Description: Patient will learn, demonstrate, and implement use of 2-3 vocal hygiene strategies (e.g. hydration, saline gargling, nasal lavage), to promote reduced laryngeal irritation.        Target Date: 07/23/19   3. Goal Identifier: Voice/Throat relaxation       Goal Description: Patient will learn, demonstrate, and implement use of 2-3 voice exercises (Semi-occluded vocal tract, resonant voice, circumlaryngeal massage), given min cues from SLP to promote reduced laryngeal tension and irritation.       Target Date: 07/23/19             Frequency and Duration: 1x/week for 6 weeks with 2-3 monthly follow-ups  Allison E. Alpers, SLP       I CERTIFY THE  NEED FOR THESE SERVICES FURNISHED UNDER        THIS PLAN OF TREATMENT AND WHILE UNDER MY CARE .             Physician Signature               Date    X_____________________________________________________                      Certification Date From:  04/23/19  Certification Date To:  07/23/19  Referring Provider: Alice Gordon MD                 Initial Assessment        See Epic Evaluation Start of Care

## 2019-05-06 NOTE — ADDENDUM NOTE
Encounter addended by: Alpers, Allison E, SLP on: 5/6/2019 9:45 AM   Actions taken: Flowsheet accepted, Sign clinical note

## 2019-05-06 NOTE — PROGRESS NOTES
[facility] OP SLP Cough and Voice Evaluation with Videostroboscopy  04/23/19 3114   General Information   Type Of Visit Initial   Start Of Care Date 04/23/19   Referring Physician Alice Gordon MD  (Asthma/Allergy)   Orders Evaluate And Treat;Other  (Videostroboscopy)   Medical Diagnosis Chronic cough   Onset Of Illness/injury Or Date Of Surgery 03/20/19  (order date)   Precautions/Limitations  no known precautions/limitations   Hearing WFL for 1:1 conversation in session   Surgical/Medical history reviewed Yes   Pertinent History Of Current Problem 77yo female with 20+year history of chronic cough.  Pt reports that the cough has progressively worsened over time, and that it is currently as bad as it has ever been.  Pt reports that she has allergic asthma, mild COPD, and GERD.  She uses inhalers and reflux medications, but still has a significant cough.  She also reports that her providers feel that the severity of her lung problems does not account for the severity of her cough.  Snow mold and cold air are triggers for the cough.  She notes that the cough is mostly productive, but is occasionally dry.  Cough was at its worst in February 2019, with pt noting that while she is still coughing frequently, she has not had a violent coughing fit for the past few weeks.  Pt does have some dysphonia, reporting mild hoarseness with voice use.  Her throat has been sore and painful recently, especially with talking and singing.  She is unable to sing because of her pain, and her voice is lower in pitch as well.  She denies difficulties with breathing and swallowing.  Please see chart for additional PMH.   Prior Level of Functioning Same problem relapsing/remitting.   Prior Level Of Function Comment Pt reports that she would always get a cough with illness prior to the onset of the chronic cough.   Patient Role/employment History Retired  (Teacher)   General Observations Pt reports that her voice is hoarse today, but  that the cough is her primary concern.   Patient/family Goals To reduce the cough as much as possible   Evaluation Results   Voice Observations COUGH/THROAT CLEAR: frequent coughing observed throughout the session, as well as some intermittent throat clearing.  Cough is sometimes productive in sound.  Locus of cough sounds consistent with a combination of upper and lower airway.   Voice Profile during conversation, 1 min monologue and paragraph reading   Voice Quality Raspy;Airy   Voice quality comments SPEECH: Consistent mild-moderate strain, consistent mild breathiness, and frequent intermittent mild roughness.  SINGING: Consistent mild-moderate breathiness and minimal roughness with strain that increases with pitch increase.   Voice quality severity rating continuum (1=Severe, 7=WNL) 5   Breath control Tight   Breath Control comments Excessive thoracic muscle use pattern on inspiration for speech.  Inspirations are inadequate for speech in volume and frequency.  Talks to past end of breath stream with poor respiratory/phonatory coordination.   Breath control severity rating continuum (1=Severe, 7=WNL) 5   Voice Use / Effort Pinched / squeezed larynx;Throat push   Voice use / Effort severity rating continuum (1=Severe, 7=WNL) 5   Fundamental frequency (Hz)   (Centered around Eb3)   Pitch /Frequency Description Too low   Pitch / Frequency comments Low habitual pitch with reduced pitch range.   Pitch / Frequency severity rating continuum (1=Severe, 7=WNL) 5   Comments Moderate chronic cough with mild-moderate dysphonia characterized by strain, roughness, breathiness, low habitual pitch with reduced pitch range, poor respiratory/phonatory coordination, and tight laryngeal musculature during phonation.   Function of Lengtheners / Shorteners (CT and TA Muscles)   Pitch glides Limited range;Upper pitches more dysphonic  (Lowest: C3; Highest: B4)   Videostroboscopy / Endoscopy   Scope Serial Number Pentax [id number]    Tissue description Red medial aspect of arytenoid cartilages;White vocal fold tissues;Other  (Minimal clear secretions)   Laryngeal movements Adduction / abduction full range of motion (ROM);Lengthening / shortening of true vocal folds (TFV) full ROM   Vibratory characteristics Reduced vibrations right;Reduced vibrations left  (Open phase predominant with some asymmetry)   Vocal fold edges Smooth   Glottal closure characteristics Spindle gap  (Mild bowing, improves with probes)   Supraglottic activity Medial-lateral compression;Anterior-posterior compression  (Mild-moderate, improves with probes)   Other observations Flexible laryngoscopy with videostroboscopy completed following application of topical anesthetic (3% Lidocaine HCL, 0.25% Phenylephrine HCL) and after obtaining informed verbal consent.  Scope was inserted via the left nostril.  Pt tolerated the procedure well.   General Therapy Interventions   Planned Therapy Interventions Voice   Voice Throat clearing elimination plan;Relaxed breath sequence techniques;Breath flow to sound flow;Voice quality/pitch or volume tasks;Resonant voice techniques;No larynx effort practice   Impressions and Recommendations   Communication Diagnosis Chronic cough, Dysphonia   Summary Ms. Henderson presents with moderate chronic cough with mild-moderate dysphonia characterized by strain, roughness, breathiness, low habitual pitch with reduced pitch range, poor respiratory/phonatory coordination, and tight laryngeal musculature during phonation.  Based on today's evaluation and videostroboscopy, there is likely a component of irritable larynx syndrome contributing to her chronic cough.  Dysphonia is accounted for by a combination of vocal fold bowing, supraglottic hyperfunction, and laryngeal irritation, and the supraglottic hyperfunction and laryngeal irritation are also likely contributing to the cough.     Recommendations A course of skilled speech therapy is recommended in  order to train strategies to reduce the cough, optimize vocal technique, improve voice quality, and promote reduced laryngeal discomfort, fatigue, and irritation in order to improve patient's overall quality of life.   Frequency and Duration 1x/week for 6 weeks with 2-3 monthly follow-ups   Prognosis  Good with intervention   Risks and Benefits of Treatment have been explained. Yes   Patient & /or Caregiver  in agreement with plan of care Yes   Patient Education SLP provided education regarding videostroboscopy and evaluation findings, as well as irritable larynx syndrome and vocal fold bowing causes, symptoms, and treatment options.  Pt verbalized understanding.   Educational Assessment   Barriers to Learning No barriers   Preferred Learning Style Listening;Reading;Demonstration;Pictures / Video   Voice Goals   Voice Goals 1;2;3   Voice Goal 1   Goal Identifier Cough   Goal Description Patient will learn, demonstrate, and implement at least 3 cough/throat clear reduction strategies (i.e. alternative cough/throat clear techniques, breathing techniques to arrest cough, management of triggers) in order to report a week of typical activities in which the cough does not exceed a level of 3 out of 10, 80% of the time.   Target Date 07/23/19   Voice Goal 2   Goal Identifier Hygiene   Goal Description Patient will learn, demonstrate, and implement use of 2-3 vocal hygiene strategies (e.g. hydration, saline gargling, nasal lavage), to promote reduced laryngeal irritation.    Target Date 07/23/19   Voice Goal 3   Goal Identifier Voice/Throat relaxation   Goal Description Patient will learn, demonstrate, and implement use of 2-3 voice exercises (Semi-occluded vocal tract, resonant voice, circumlaryngeal massage), given min cues from SLP to promote reduced laryngeal tension and irritation.   Target Date 07/23/19   Total Session Time   Voice Minutes (31663) 30   Laryngoscopy W/Stroboscopy Minutes (81344) 30   Total Evaluation  Time 60   Therapy Certification   Certification date from 04/23/19   Certification date to 07/23/19   Medical Diagnosis Chronic cough, Dysphonia         Thank you for the referral of this patient.    Allison Alpers, B.A. (music), M.A., CCC-SLP  Speech-Language Pathologist  Certificate of Vocology  [facility]  [phone]

## 2019-05-13 ENCOUNTER — HOSPITAL ENCOUNTER (OUTPATIENT)
Dept: SPEECH THERAPY | Facility: CLINIC | Age: 79
Setting detail: THERAPIES SERIES
End: 2019-05-13
Attending: ALLERGY & IMMUNOLOGY
Payer: MEDICARE

## 2019-05-13 PROCEDURE — 92507 TX SP LANG VOICE COMM INDIV: CPT | Mod: GN | Performed by: STUDENT IN AN ORGANIZED HEALTH CARE EDUCATION/TRAINING PROGRAM

## 2019-05-20 ENCOUNTER — HOSPITAL ENCOUNTER (OUTPATIENT)
Dept: SPEECH THERAPY | Facility: CLINIC | Age: 79
Setting detail: THERAPIES SERIES
End: 2019-05-20
Attending: ALLERGY & IMMUNOLOGY
Payer: MEDICARE

## 2019-05-20 PROCEDURE — 92507 TX SP LANG VOICE COMM INDIV: CPT | Mod: GN | Performed by: STUDENT IN AN ORGANIZED HEALTH CARE EDUCATION/TRAINING PROGRAM

## 2019-05-20 NOTE — DISCHARGE INSTRUCTIONS
Voice Therapy 5/20/2019      Relaxed, belly/diaphragmatic breathing  o Abdomen should inflate when you breathe in and deflate when you breathe out     Cup and bubbles exercise (tissue mobilization)  o Put 1 in. of water in a cup  o Start just blowing bubbles through the straw WITHOUT voice  - It should feel easy in your throat  o Add voice at a comfortable pitch, making sure that the bubbles stay continuous and your throat is still relaxed  o Toggle between two pitches  o Do gentle glides up and down in pitch  o Do repeated accents, like you re revving the engine of a car  o Sing the tune of Happy Birthday or other songs  o Changes in loudness on a single note  - Pick a comfortable pitch  - Start soft, gradually get louder, then get soft again  - Repeat at a different pitch      **Practice these exercises several times every day, for 2-5 minutes at a time

## 2019-05-29 ENCOUNTER — HOSPITAL ENCOUNTER (OUTPATIENT)
Dept: SPEECH THERAPY | Facility: CLINIC | Age: 79
Setting detail: THERAPIES SERIES
End: 2019-05-29
Attending: ALLERGY & IMMUNOLOGY
Payer: MEDICARE

## 2019-05-29 PROCEDURE — 92507 TX SP LANG VOICE COMM INDIV: CPT | Mod: GN | Performed by: STUDENT IN AN ORGANIZED HEALTH CARE EDUCATION/TRAINING PROGRAM

## 2019-05-29 NOTE — DISCHARGE INSTRUCTIONS
Voice Therapy 5/29/2019      Relaxed breathing  o Three main patterns:  - Even in and even out, slowing the rate of breathing  - Prolonged exhalation, e.g. 2:5 (in on a count of 2, out on a count of 5)  - Box breathing    Work on slowing your rate of breathing by doing box breathing                       Breathe on equal count for all sides of the box (e.g. count of 4)

## 2019-09-27 ENCOUNTER — HOSPITAL ENCOUNTER (OUTPATIENT)
Dept: CT IMAGING | Facility: CLINIC | Age: 79
Discharge: HOME | End: 2019-09-27
Attending: INTERNAL MEDICINE | Admitting: INTERNAL MEDICINE
Payer: MEDICARE

## 2019-09-27 DIAGNOSIS — R11.0 NAUSEA: ICD-10-CM

## 2019-09-27 DIAGNOSIS — R19.7 DIARRHEA, UNSPECIFIED: ICD-10-CM

## 2019-09-27 DIAGNOSIS — R10.9 ABDOMINAL CRAMPING: ICD-10-CM

## 2019-09-27 DIAGNOSIS — R63.0 DECREASED APPETITE: ICD-10-CM

## 2019-09-27 LAB
CREAT BLD-MCNC: 0.7 MG/DL (ref 0.52–1.04)
GFR SERPL CREATININE-BSD FRML MDRD: 81 ML/MIN/{1.73_M2}

## 2019-09-27 PROCEDURE — 82565 ASSAY OF CREATININE: CPT

## 2019-09-27 PROCEDURE — 74177 CT ABD & PELVIS W/CONTRAST: CPT

## 2019-09-27 PROCEDURE — 25000128 H RX IP 250 OP 636: Performed by: RADIOLOGY

## 2019-09-27 PROCEDURE — 25000125 ZZHC RX 250: Performed by: RADIOLOGY

## 2019-09-27 RX ORDER — IOPAMIDOL 755 MG/ML
500 INJECTION, SOLUTION INTRAVASCULAR ONCE
Status: COMPLETED | OUTPATIENT
Start: 2019-09-27 | End: 2019-09-27

## 2019-09-27 RX ADMIN — IOPAMIDOL 83 ML: 755 INJECTION, SOLUTION INTRAVENOUS at 10:19

## 2019-09-27 RX ADMIN — SODIUM CHLORIDE 51 ML: 9 INJECTION, SOLUTION INTRAVENOUS at 10:19

## 2020-03-02 ENCOUNTER — HEALTH MAINTENANCE LETTER (OUTPATIENT)
Age: 80
End: 2020-03-02

## 2020-03-25 NOTE — ADDENDUM NOTE
Encounter addended by: Alpers, Allison E, SLP on: 3/25/2020 2:23 PM   Actions taken: Episode resolved

## 2020-08-21 ENCOUNTER — APPOINTMENT (OUTPATIENT)
Dept: GENERAL RADIOLOGY | Facility: CLINIC | Age: 80
End: 2020-08-21
Attending: PHYSICIAN ASSISTANT
Payer: MEDICARE

## 2020-08-21 ENCOUNTER — HOSPITAL ENCOUNTER (OUTPATIENT)
Facility: CLINIC | Age: 80
Setting detail: OBSERVATION
Discharge: HOME | End: 2020-08-22
Attending: PHYSICIAN ASSISTANT | Admitting: INTERNAL MEDICINE
Payer: MEDICARE

## 2020-08-21 DIAGNOSIS — K21.9 GASTROESOPHAGEAL REFLUX DISEASE WITHOUT ESOPHAGITIS: Primary | ICD-10-CM

## 2020-08-21 DIAGNOSIS — R07.9 CHEST PAIN: ICD-10-CM

## 2020-08-21 LAB
ANION GAP SERPL CALCULATED.3IONS-SCNC: 5 MMOL/L (ref 3–14)
BASOPHILS # BLD AUTO: 0 10E9/L (ref 0–0.2)
BASOPHILS NFR BLD AUTO: 0.3 %
BUN SERPL-MCNC: 14 MG/DL (ref 7–30)
CALCIUM SERPL-MCNC: 9.3 MG/DL (ref 8.5–10.1)
CHLORIDE SERPL-SCNC: 109 MMOL/L (ref 94–109)
CO2 SERPL-SCNC: 26 MMOL/L (ref 20–32)
CREAT SERPL-MCNC: 0.66 MG/DL (ref 0.52–1.04)
DIFFERENTIAL METHOD BLD: ABNORMAL
EOSINOPHIL # BLD AUTO: 0.1 10E9/L (ref 0–0.7)
EOSINOPHIL NFR BLD AUTO: 1.4 %
ERYTHROCYTE [DISTWIDTH] IN BLOOD BY AUTOMATED COUNT: 14.5 % (ref 10–15)
GFR SERPL CREATININE-BSD FRML MDRD: 84 ML/MIN/{1.73_M2}
GLUCOSE SERPL-MCNC: 112 MG/DL (ref 70–99)
HCT VFR BLD AUTO: 40.2 % (ref 35–47)
HGB BLD-MCNC: 13.1 G/DL (ref 11.7–15.7)
IMM GRANULOCYTES # BLD: 0 10E9/L (ref 0–0.4)
IMM GRANULOCYTES NFR BLD: 0.5 %
LYMPHOCYTES # BLD AUTO: 1.5 10E9/L (ref 0.8–5.3)
LYMPHOCYTES NFR BLD AUTO: 26 %
MCH RBC QN AUTO: 32.6 PG (ref 26.5–33)
MCHC RBC AUTO-ENTMCNC: 32.6 G/DL (ref 31.5–36.5)
MCV RBC AUTO: 100 FL (ref 78–100)
MONOCYTES # BLD AUTO: 0.3 10E9/L (ref 0–1.3)
MONOCYTES NFR BLD AUTO: 5.9 %
NEUTROPHILS # BLD AUTO: 3.8 10E9/L (ref 1.6–8.3)
NEUTROPHILS NFR BLD AUTO: 65.9 %
NRBC # BLD AUTO: 0 10*3/UL
NRBC BLD AUTO-RTO: 0 /100
PLATELET # BLD AUTO: 116 10E9/L (ref 150–450)
POTASSIUM SERPL-SCNC: 3.8 MMOL/L (ref 3.4–5.3)
RBC # BLD AUTO: 4.02 10E12/L (ref 3.8–5.2)
SODIUM SERPL-SCNC: 140 MMOL/L (ref 133–144)
TROPONIN I SERPL-MCNC: <0.015 UG/L (ref 0–0.04)
TROPONIN I SERPL-MCNC: <0.015 UG/L (ref 0–0.04)
WBC # BLD AUTO: 5.8 10E9/L (ref 4–11)

## 2020-08-21 PROCEDURE — 25000132 ZZH RX MED GY IP 250 OP 250 PS 637: Mod: GY | Performed by: PHYSICIAN ASSISTANT

## 2020-08-21 PROCEDURE — U0003 INFECTIOUS AGENT DETECTION BY NUCLEIC ACID (DNA OR RNA); SEVERE ACUTE RESPIRATORY SYNDROME CORONAVIRUS 2 (SARS-COV-2) (CORONAVIRUS DISEASE [COVID-19]), AMPLIFIED PROBE TECHNIQUE, MAKING USE OF HIGH THROUGHPUT TECHNOLOGIES AS DESCRIBED BY CMS-2020-01-R: HCPCS | Performed by: PHYSICIAN ASSISTANT

## 2020-08-21 PROCEDURE — 80048 BASIC METABOLIC PNL TOTAL CA: CPT | Performed by: PHYSICIAN ASSISTANT

## 2020-08-21 PROCEDURE — 84484 ASSAY OF TROPONIN QUANT: CPT | Performed by: PHYSICIAN ASSISTANT

## 2020-08-21 PROCEDURE — 25000132 ZZH RX MED GY IP 250 OP 250 PS 637: Mod: GY

## 2020-08-21 PROCEDURE — 85025 COMPLETE CBC W/AUTO DIFF WBC: CPT | Performed by: PHYSICIAN ASSISTANT

## 2020-08-21 PROCEDURE — 99220 ZZC INITIAL OBSERVATION CARE,LEVL III: CPT | Performed by: PHYSICIAN ASSISTANT

## 2020-08-21 PROCEDURE — 36415 COLL VENOUS BLD VENIPUNCTURE: CPT | Performed by: PHYSICIAN ASSISTANT

## 2020-08-21 PROCEDURE — 71046 X-RAY EXAM CHEST 2 VIEWS: CPT

## 2020-08-21 PROCEDURE — G0378 HOSPITAL OBSERVATION PER HR: HCPCS

## 2020-08-21 PROCEDURE — 99285 EMERGENCY DEPT VISIT HI MDM: CPT | Mod: 25

## 2020-08-21 PROCEDURE — 93005 ELECTROCARDIOGRAM TRACING: CPT

## 2020-08-21 PROCEDURE — C9803 HOPD COVID-19 SPEC COLLECT: HCPCS

## 2020-08-21 RX ORDER — VERAPAMIL HYDROCHLORIDE 120 MG/1
120 TABLET, FILM COATED, EXTENDED RELEASE ORAL DAILY
Status: DISCONTINUED | OUTPATIENT
Start: 2020-08-21 | End: 2020-08-22 | Stop reason: HOSPADM

## 2020-08-21 RX ORDER — ROSUVASTATIN CALCIUM 5 MG/1
5 TABLET, COATED ORAL AT BEDTIME
Status: DISCONTINUED | OUTPATIENT
Start: 2020-08-21 | End: 2020-08-22 | Stop reason: HOSPADM

## 2020-08-21 RX ORDER — LIDOCAINE 40 MG/G
CREAM TOPICAL
Status: DISCONTINUED | OUTPATIENT
Start: 2020-08-21 | End: 2020-08-22 | Stop reason: HOSPADM

## 2020-08-21 RX ORDER — NITROGLYCERIN 0.4 MG/1
0.4 TABLET SUBLINGUAL EVERY 5 MIN PRN
Status: DISCONTINUED | OUTPATIENT
Start: 2020-08-21 | End: 2020-08-22 | Stop reason: HOSPADM

## 2020-08-21 RX ORDER — LIDOCAINE HYDROCHLORIDE 20 MG/ML
INJECTION, SOLUTION INFILTRATION; PERINEURAL
COMMUNITY
Start: 2020-06-09

## 2020-08-21 RX ORDER — NITROGLYCERIN 0.4 MG/1
TABLET SUBLINGUAL
Status: COMPLETED
Start: 2020-08-21 | End: 2020-08-21

## 2020-08-21 RX ORDER — ALUMINA, MAGNESIA, AND SIMETHICONE 2400; 2400; 240 MG/30ML; MG/30ML; MG/30ML
30 SUSPENSION ORAL EVERY 4 HOURS PRN
Status: DISCONTINUED | OUTPATIENT
Start: 2020-08-21 | End: 2020-08-22 | Stop reason: HOSPADM

## 2020-08-21 RX ORDER — VERAPAMIL HYDROCHLORIDE 120 MG/1
120 TABLET, FILM COATED, EXTENDED RELEASE ORAL DAILY
COMMUNITY
Start: 2020-05-22

## 2020-08-21 RX ORDER — ACETAMINOPHEN 650 MG/1
650 SUPPOSITORY RECTAL EVERY 4 HOURS PRN
Status: DISCONTINUED | OUTPATIENT
Start: 2020-08-21 | End: 2020-08-22 | Stop reason: HOSPADM

## 2020-08-21 RX ORDER — SUCRALFATE ORAL 1 G/10ML
1 SUSPENSION ORAL
Status: DISCONTINUED | OUTPATIENT
Start: 2020-08-21 | End: 2020-08-21

## 2020-08-21 RX ORDER — CYCLOSPORINE 0.5 MG/ML
1 EMULSION OPHTHALMIC 2 TIMES DAILY
COMMUNITY
Start: 2020-06-09

## 2020-08-21 RX ORDER — TRAZODONE HYDROCHLORIDE 50 MG/1
50 TABLET, FILM COATED ORAL AT BEDTIME
Status: DISCONTINUED | OUTPATIENT
Start: 2020-08-21 | End: 2020-08-22 | Stop reason: HOSPADM

## 2020-08-21 RX ORDER — AZELASTINE HYDROCHLORIDE 0.5 MG/ML
1 SOLUTION/ DROPS OPHTHALMIC 2 TIMES DAILY
Status: ON HOLD | COMMUNITY
Start: 2020-06-17 | End: 2020-08-21

## 2020-08-21 RX ORDER — ESCITALOPRAM OXALATE 10 MG/1
10 TABLET ORAL AT BEDTIME
Status: DISCONTINUED | OUTPATIENT
Start: 2020-08-21 | End: 2020-08-22 | Stop reason: HOSPADM

## 2020-08-21 RX ORDER — ROSUVASTATIN CALCIUM 5 MG/1
5 TABLET, COATED ORAL AT BEDTIME
COMMUNITY
Start: 2020-07-25

## 2020-08-21 RX ORDER — NALOXONE HYDROCHLORIDE 0.4 MG/ML
.1-.4 INJECTION, SOLUTION INTRAMUSCULAR; INTRAVENOUS; SUBCUTANEOUS
Status: DISCONTINUED | OUTPATIENT
Start: 2020-08-21 | End: 2020-08-22 | Stop reason: HOSPADM

## 2020-08-21 RX ORDER — MORPHINE SULFATE 2 MG/ML
1 INJECTION, SOLUTION INTRAMUSCULAR; INTRAVENOUS
Status: DISCONTINUED | OUTPATIENT
Start: 2020-08-21 | End: 2020-08-22 | Stop reason: HOSPADM

## 2020-08-21 RX ORDER — RALOXIFENE HYDROCHLORIDE 60 MG/1
1 TABLET, FILM COATED ORAL DAILY
COMMUNITY
Start: 2020-07-01

## 2020-08-21 RX ORDER — SUCRALFATE ORAL 1 G/10ML
1 SUSPENSION ORAL
Status: DISCONTINUED | OUTPATIENT
Start: 2020-08-21 | End: 2020-08-22 | Stop reason: HOSPADM

## 2020-08-21 RX ORDER — ACETAMINOPHEN 325 MG/1
650 TABLET ORAL EVERY 4 HOURS PRN
Status: DISCONTINUED | OUTPATIENT
Start: 2020-08-21 | End: 2020-08-22 | Stop reason: HOSPADM

## 2020-08-21 RX ORDER — ALBUTEROL SULFATE 90 UG/1
2 AEROSOL, METERED RESPIRATORY (INHALATION) EVERY 6 HOURS PRN
Status: DISCONTINUED | OUTPATIENT
Start: 2020-08-21 | End: 2020-08-22 | Stop reason: HOSPADM

## 2020-08-21 RX ORDER — ASPIRIN 81 MG/1
324 TABLET, CHEWABLE ORAL ONCE
Status: COMPLETED | OUTPATIENT
Start: 2020-08-21 | End: 2020-08-21

## 2020-08-21 RX ORDER — NITROGLYCERIN 0.4 MG/1
0.4 TABLET SUBLINGUAL EVERY 5 MIN PRN
Status: DISCONTINUED | OUTPATIENT
Start: 2020-08-21 | End: 2020-08-21

## 2020-08-21 RX ORDER — ASPIRIN 81 MG/1
81 TABLET ORAL DAILY
Status: DISCONTINUED | OUTPATIENT
Start: 2020-08-22 | End: 2020-08-22 | Stop reason: HOSPADM

## 2020-08-21 RX ADMIN — VERAPAMIL HYDROCHLORIDE 120 MG: 120 TABLET, FILM COATED, EXTENDED RELEASE ORAL at 20:00

## 2020-08-21 RX ADMIN — ASPIRIN 81 MG 324 MG: 81 TABLET ORAL at 14:32

## 2020-08-21 RX ADMIN — ESCITALOPRAM OXALATE 10 MG: 10 TABLET ORAL at 20:00

## 2020-08-21 RX ADMIN — TRAZODONE HYDROCHLORIDE 50 MG: 50 TABLET ORAL at 20:00

## 2020-08-21 RX ADMIN — NITROGLYCERIN 0.4 MG: 0.4 TABLET SUBLINGUAL at 12:28

## 2020-08-21 RX ADMIN — SUCRALFATE 1 G: 1 SUSPENSION ORAL at 20:00

## 2020-08-21 ASSESSMENT — MIFFLIN-ST. JEOR
SCORE: 1204.82
SCORE: 1229.31

## 2020-08-21 ASSESSMENT — ENCOUNTER SYMPTOMS
SHORTNESS OF BREATH: 0
DIAPHORESIS: 0
NAUSEA: 0
VOMITING: 0

## 2020-08-21 NOTE — PLAN OF CARE
ROOM # 226    Living Situation (if not independent, order SW consult): Lives in a house with .   Facility name:  : [facility] () [phone]    Activity level at baseline: Independent   Activity level on admit: Independent       Patient registered to observation; given Patient Bill of Rights; given the opportunity to ask questions about observation status and their plan of care.  Patient has been oriented to the observation room, bathroom and call light is in place.    Discussed discharge goals and expectations with patient/family.

## 2020-08-21 NOTE — ED NOTES
"[facility]  ED Nurse Handoff Report    Suzanne Henderson is a 80 year old female   ED Chief complaint: Chest Pain  . ED Diagnosis:   Final diagnoses:   Chest pain     Allergies:   Allergies   Allergen Reactions     Yellow Jacket Venom [Venomil Honey Bee] Anaphylaxis     Sulfa Drugs Hives     Vancomycin Hives       Code Status: Full Code  Activity level - Baseline/Home:  Independent. Activity Level - Current:   Independent. Lift room needed: No. Bariatric: No   Needed: No   Isolation: No. Infection: Not Applicable.     Vital Signs:   Vitals:    08/21/20 1205 08/21/20 1215 08/21/20 1230 08/21/20 1400   BP: 139/79 136/82  121/65   Pulse: 84 81 92 77   Resp: 16 12 13 19   Temp: 98  F (36.7  C)      TempSrc: Oral      SpO2: 96% 95% 96% 96%   Weight: 71.7 kg (158 lb)      Height: 1.702 m (5' 7\")          Cardiac Rhythm:  ,   Cardiac  Cardiac Rhythm: Normal sinus rhythm  Pain level: 0-10 Pain Scale: 2(pain was 4/10 at initiation of nitro, 2/10 at present)  Patient confused: No. Patient Falls Risk: No.   Elimination Status: Has voided   Patient Report - Initial Complaint: Pt having substernal chest pain since last night.  Pt states that it feels different than her heart burn.  Pt reports taking 1 nitroglycerine at 730am with some relief of pain.  . Focused Assessment: chest pain   Tests Performed: 8/21/2020   Labs Ordered and Resulted from Time of ED Arrival Up to the Time of Departure from the ED   CBC WITH PLATELETS DIFFERENTIAL - Abnormal; Notable for the following components:       Result Value    Platelet Count 116 (*)     All other components within normal limits   BASIC METABOLIC PANEL - Abnormal; Notable for the following components:    Glucose 112 (*)     All other components within normal limits   TROPONIN I   . Abnormal Results: as noted.   Treatments provided: nitroglycerin x1, aspirin  Family Comments: n/a  OBS brochure/video discussed/provided to patient:  Yes  ED Medications: "   Medications   nitroGLYcerin (NITROSTAT) sublingual tablet 0.4 mg (0.4 mg Sublingual Given 8/21/20 1228)   aspirin (ASA) chewable tablet 324 mg (324 mg Oral Given 8/21/20 1432)     Drips infusing:  No  For the majority of the shift, the patient's behavior Green. Interventions performed were n/a.    Sepsis treatment initiated: No       ED Nurse Name/Phone Number: Ayaka Perkins RN,   2:33 PM    RECEIVING UNIT ED HANDOFF REVIEW    Above ED Nurse Handoff Report was reviewed: Yes  Reviewed by: Mary Pike RN on August 21, 2020 at 3:11 PM

## 2020-08-21 NOTE — ED TRIAGE NOTES
Pt having substernal chest pain since last night.  Pt states that it feels different than her heart burn.  Pt reports taking 1 nitroglycerine at 730am with some relief of pain.

## 2020-08-21 NOTE — PHARMACY-ADMISSION MEDICATION HISTORY
Admission medication history interview status for this patient is complete. See [facility] admission navigator for allergy information, prior to admission medications and immunization status.     Medication history interview done via telephone during Covid-19 pandemic, indicate source(s): Patient  Medication history resources (including written lists, pill bottles, clinic record):None  Pharmacy: [facility] [address]  Changes made to PTA medication list:  Added: -  Deleted: optivar, elmiron  Changed: albuterol to prn    Actions taken by pharmacist (provider contacted, etc):called pt for mr     Additional medication history information:None    Medication reconciliation/reorder completed by provider prior to medication history?  no   (Y/N)     For patients on insulin therapy: no  (Y/N)  Sliding scale Novolog: -  Do you have a baseline novolog pre-meal dose:   __  units with meals or __ units/carb unit with meals  Do you eat three meals a day:  -  How many times do you check your blood glucose per day:  -  How many episodes of hypoglycemia do you have per week: -  Do you have a Continuous glucose monitor (CGM) : - (remind pt that not approved for hospital use)  Any specific barriers to therapy? -  (cost, comfortable with injections, confident with current diabetes regimen?)      Prior to Admission medications    Medication Sig Last Dose Taking? Auth Provider   ADVAIR DISKUS 250-50 MCG/DOSE inhaler Inhale 1 puff into the lungs 2 times daily 8/21/2020 at x1 Yes Unknown, Entered By History   albuterol (PROAIR HFA/PROVENTIL HFA/VENTOLIN HFA) 108 (90 Base) MCG/ACT inhaler Inhale 2 puffs into the lungs every 6 hours as needed   Yes Reported, Patient   Calcium Citrate-Vitamin D (CALCIUM + D PO) Take 2 tablets by mouth daily  8/20/2020 at Unknown time Yes Reported, Patient   EPINEPHrine 0.3 MG/0.3ML injection 2-pack Inject 0.3 mg into the muscle as needed for anaphylaxis  Yes Reported, Patient   escitalopram (LEXAPRO) 10 MG tablet Take 10  mg by mouth daily  8/20/2020 at hs Yes Reported, Patient   fluticasone (VERAMYST) 27.5 MCG/SPRAY nasal spray Spray 2 sprays into both nostrils daily. 8/21/2020 at Unknown time Yes Reported, Patient   INCRUSE ELLIPTA 62.5 MCG/INH Inhaler Inhale 1 puff into the lungs daily 8/21/2020 at Unknown time Yes Unknown, Entered By History   Multiple Vitamins-Minerals (MULTIVITAMIN ADULT PO) Take 1 tablet by mouth daily 8/21/2020 at Unknown time Yes Reported, Patient   omeprazole (PRILOSEC) 40 MG DR capsule Take 40 mg by mouth every morning  8/21/2020 at Unknown time Yes Reported, Patient   raloxifene (EVISTA) 60 MG tablet Take 1 tablet by mouth daily 8/21/2020 at Unknown time Yes Unknown, Entered By History   RESTASIS 0.05 % ophthalmic emulsion Place 1 drop into both eyes 2 times daily 8/21/2020 at x1 Yes Unknown, Entered By History   rosuvastatin (CRESTOR) 5 MG tablet Take 5 mg by mouth At Bedtime 8/20/2020 at Unknown time Yes Unknown, Entered By History   traZODone (DESYREL) 50 MG tablet Take 50 mg by mouth At Bedtime  8/20/2020 at Unknown time Yes Reported, Patient   verapamil ER (CALAN-SR) 120 MG CR tablet Take 120 mg by mouth daily 8/21/2020 at Unknown time Yes Unknown, Entered By History   Vitamin D (Cholecalciferol) 25 MCG (1000 UT) TABS Take 1,000 Units by mouth daily  8/21/2020 at Unknown time Yes Reported, Patient   lidocaine 2 % injection Draw up 10mL of LidoCANE 2% AND mix with 3mL of Sodium Bicarb. Put in sterile cup for bladder instillation.   Unknown, Entered By History   sodium bicarbonate 8.4 % injection DRAW UP 3 ML, FOR USE IN HOME BLADDER INSTILLATIONS.   Unknown, Entered By History

## 2020-08-21 NOTE — PROGRESS NOTES
"PRIMARY DIAGNOSIS: CHEST PAIN  OUTPATIENT/OBSERVATION GOALS TO BE MET BEFORE DISCHARGE:  1. Ruled out acute coronary syndrome (negative or stable Troponin):  Yes  2. Pain Status: Pain free.  3. Appropriate provocative testing performed: Yes  - Stress Test Procedure: Stress Echo   - Interpretation of cardiac rhythm per telemetry tech: SR HR 73    4. Cleared by Consultants (if applicable):No  5. Return to near baseline physical activity: Yes  Discharge Planner Nurse   Safe discharge environment identified: Yes  Barriers to discharge: Yes       Entered by: Mary Pike 08/21/2020 4:32 PM     Please review provider order for any additional goals.   Nurse to notify provider when observation goals have been met and patient is ready for discharge.  Patient is alert and oriented x4. VS WNL and documented on the FS. Lung sounds clear in all lobes and patient is on RA. Denies SOB. Active bowel sounds. Patient denies any pain, urgency, and frequency when voiding. Denies chest pain. SL. Regular diet. Tele in place and SR. Patients  brought her medications in and patient was taking them without this writer knowing. Patient refused Carafate and stated she all ready took her omeprazole. Educated patient that she can not take her own medications d/t policy. Patient then agreed to give this writer her medications and  took home. Independent in room. Plan: Tele, trops, stress echo, Morphine and nitroglycerin PRN. Regular diet with no caffeine this even and then clears 4 hrs prior to stress test.     /69 (BP Location: Right arm)   Pulse 82   Temp 97.9  F (36.6  C) (Oral)   Resp 18   Ht 1.702 m (5' 7\")   Wt 69.2 kg (152 lb 9.6 oz)   SpO2 97%   BMI 23.90 kg/m       "

## 2020-08-21 NOTE — H&P
[facility] Outpatient / Observation Unit  History and Physical Exam     Suzanne Henderson MRN# [id number]   YOB: 1940 Age: 80 year old      Date of Admission: 8/21/2020    Primary care provider: Servando Martin          Assessment:   Suzanne Henderson is a 80 year old female with a PMH significant for h/o SVT, asthma, COPD, interstitial cystitis, fibromyalgia, GERD, previous tobacco abuse, and HLD who presents with chest pain.     Work up in ED reveals: VSS, BMP unremarkable, negative troponin, glucose 112, platelet count of 116 otherwise CBC WNL, chest x-ray shows no suspicious focal pulmonary opacities, pleural effusion, or pneumothorax, bilateral calcified breast implants, and EKG shows rate of 70 bpm and NSR with nonspecific ST abnormality.    Patient is being registered to observation for further evaluation and to rule out possible ACS.     #Acute chest pain: suspect noncardiac etiology. Onset of central and left sided chest pain at rest with intermittent radiation into left neck and left lower shoulder blade without associated shortness of breath. Pain described as a dull ache and mild pressure. No increased pain with exertion. Pain improved with sublingual nitroglycerin at home and again in ED with return of mild discomfort. Pain seems different from GERD pain patient has previously experienced. Previous echocardiogram in 2018 showed EF of 60-65% and no significant valvular disease. She had a stress test many years ago but none recently. Risk factors include family history, previous tobacco abuse, and recently diagnosed HLD that she is on statin therapy for. Initial troponin negative and EKG with nonspecific ST changes but no ischemic changes. Plan for serial troponins and exercise stress test in AM, patient believes she would be able to walk on a treadmill.     #h/o SVT: diagnosed in 1998, originally on beta blocker but this was stopped in 2016 after receiving allergy shots following a severe allergic  reaction to a yellow jacket sting. Previously evaluated by Dr. Bautista of cardiology at [facility] in [place].   - continue PTA Verapamil     #HLD: continue PTA Rosuvastatin, no recent lipid panel in chart     #GERD: severe per patient reports, could be having esophageal spasms as cause for presenting chest pain  - continue PTA Omeprazole  - trial Carafate with meals and at bedtime     #Asthma   #COPD: no recent or current exacerbations but reports increased cough due to seasonal allergies this season.   - continue PTA Advair and Incruse Ellipta inhalers if brought in by patient  - PRN albuterol neb     #Interstitial cystitis: follows with Dr. Vega of Urology for management     #Anxiety: resume PTA Lexapro and Trazodone for sleep     #Previous tobacco abuse: smoked about 3 cigarettes per day for 13 years, quit in 1973         Plan:     1. [place] to Observation  2. Continue telemetry  3. Follow serial troponins, check fasting lipids   4. Stress testing with Stress Echo  5. Cont Aspirin EC 81 mg po daily  6. Blood pressure control  7. Morphine and nitroglycerine PRN for pain    8. Regular diet with no caffeine this evening, clear liquids four hours prior to stress test    DVT prophylaxis: pt at low risk, encourage ambulation  Code Status: DNI, confirmed with patient, she plans to have her  bring in her advance care directive from home  Dispo: likely discharge in 24-48 hours, will admit to observation unit                Chief Complaint:   Chest Pain         History of Present Illness:   Suzanne, a 80 year old female, presents to the ED with complaint of chest pain.  The patient states that last night after 8 PM she had onset of central and left-sided chest pain she describes as a dull ache with mild pressure with intermittent radiation into her left neck and bottom of her left shoulder blade.  This occurred while at rest while the patient was watching TV.  Denies associated shortness  of breath, diaphoresis, nausea, vomiting, palpitations, lightheadedness, or dizziness.  The pain was mild enough that she was able to go to bed but when she woke up this morning she still had pain.  The patient had previous supply of sublingual nitroglycerin, which she believes was for her acid reflux, and she took one sublingual tablet this morning which improved her pain for a couple of hours.  Patient was able to take her dog on a walk for half a mile this morning as she usually does without increased pain. She received a second sublingual nitroglycerin in the emergency room which improved her pain but she is now having recurrent mild chest pain without radiation.  Denies associated burning sensation or abdominal pain.  Denies any recent fever, chills, cough, or congestion.  Denies any prior episodes similar to this.  Denies any recent strenuous lifting or increased stress.  The patient has had issues with severe acid reflux which is caused her significant discomfort but that her pain is different than today.    Cardiac risk factors: positive for abnormal lipids, family history with mother having a MI at an older age, and previous tobacco abuse          Past Medical History:     Past Medical History:   Diagnosis Date     Allergic rhinitis due to allergen     Dust, Ragweed     Arrhythmia     tachyarrythmia     Arthritis      Asthma     states coughs alot and has been told at one time had asthma and used inhaler     Bladder dysfunction      Bladder pain      Chronic interstitial cystitis      Colon polyps      COPD (chronic obstructive pulmonary disease) (H)     mild     External hemorrhoids      Fibromyalgia      Gastro-oesophageal reflux disease      GERD (gastroesophageal reflux disease)      Hiatal hernia      Hiatal hernia      Lumbar radiculopathy, right      Multiple pulmonary nodules      Osteopenia      Pain      PONV (postoperative nausea and vomiting)      Ptosis      Pulmonary nodules      Sleep apnea       Tachycardia      Urticaria            Past Surgical History:     Past Surgical History:   Procedure Laterality Date     APPENDECTOMY       BACK SURGERY      cervical discetomy     BREAST SURGERY       C DIAG IMP MAMMO DIG BILAT, INCL CAD WHEN PERF       COLONOSCOPY       COSMETIC SURGERY  1975    breast implants     CYSTOSCOPY       CYSTOSCOPY, BIOPSY BLADDER, COMBINED  1/24/2013    Procedure: COMBINED CYSTOSCOPY, BIOPSY BLADDER;  cystoscopy, bladder biopsy and fulgaration;  Surgeon: Araceli Vega MD;  Location:  OR     CYSTOSCOPY, FULGURATE BLADDER TUMOR, COMBINED N/A 11/2/2016    Procedure: COMBINED CYSTOSCOPY, FULGURATE BLADDER TUMOR;  Surgeon: Araceli Vega MD;  Location:  OR     CYSTOSCOPY, FULGURATE BLADDER TUMOR, COMBINED N/A 1/30/2019    Procedure: COMBINED CYSTOSCOPY,  BLADDER HYSRODISTENTION FULGURATION OF  BLADDER ULCERS , BLADDER WAASHING, BLADDER BIOPSY;  Surgeon: Araceli Vega MD;  Location:  OR     CYSTOSCOPY, RETROGRADES, COMBINED  1/24/2013    Procedure: COMBINED CYSTOSCOPY, RETROGRADES;  CYSTOSCOPY, BILATERAL RETROGRADES, BLADDER BIOPSY, FULGURATION ;  Surgeon: Araceli Vega MD;  Location:  OR     EYE SURGERY       REPAIR PTOSIS BILATERAL Bilateral 10/19/2018    Procedure: BILATERAL UPPER LID PTOSIS AND MECHANICAL PTOSIS REPAIRS;  Surgeon: Eric Spears MD;  Location:  SD     TONSILLECTOMY & ADENOIDECTOMY       TUBAL LIGATION            Social History:     Social History     Socioeconomic History     Marital status:      Spouse name: Not on file     Number of children: Not on file     Years of education: Not on file     Highest education level: Not on file   Occupational History     Not on file   Social Needs     Financial resource strain: Not on file     Food insecurity     Worry: Not on file     Inability: Not on file     Transportation needs     Medical: Not on file     Non-medical: Not on file   Tobacco Use     Smoking status: Former  Smoker     Last attempt to quit: 1973     Years since quittin.6     Smokeless tobacco: Never Used   Substance and Sexual Activity     Alcohol use: Yes     Comment: occasionally     Drug use: No     Sexual activity: Not on file   Lifestyle     Physical activity     Days per week: Not on file     Minutes per session: Not on file     Stress: Not on file   Relationships     Social connections     Talks on phone: Not on file     Gets together: Not on file     Attends [employer] service: Not on file     Active member of club or organization: Not on file     Attends meetings of clubs or organizations: Not on file     Relationship status: Not on file     Intimate partner violence     Fear of current or ex partner: Not on file     Emotionally abused: Not on file     Physically abused: Not on file     Forced sexual activity: Not on file   Other Topics Concern     Parent/sibling w/ CABG, MI or angioplasty before 65F 55M? Not Asked   Social History Narrative     Not on file          Family History:   Family history reviewed with patient and significant for mother with MI at older age.         Allergies:      Allergies   Allergen Reactions     Yellow Jacket Venom [Venomil Honey Bee] Anaphylaxis     Sulfa Drugs Hives     Vancomycin Hives          Medications:     Prior to Admission medications    Medication Sig Last Dose Taking? Auth Provider   ADVAIR DISKUS 250-50 MCG/DOSE inhaler Inhale 1 puff into the lungs 2 times daily   Unknown, Entered By History   albuterol (PROAIR HFA/PROVENTIL HFA/VENTOLIN HFA) 108 (90 Base) MCG/ACT inhaler Inhale 2 puffs into the lungs every 6 hours   Reported, Patient   azelastine (OPTIVAR) 0.05 % ophthalmic solution Place 1 drop into both eyes 2 times daily   Unknown, Entered By History   Calcium Citrate-Vitamin D (CALCIUM + D PO) Take 1 tablet by mouth daily   Reported, Patient   EPINEPHrine 0.3 MG/0.3ML injection 2-pack Inject 0.3 mg into the muscle as needed for anaphylaxis   Reported,  "Patient   escitalopram (LEXAPRO) 10 MG tablet Take 10 mg by mouth daily    Reported, Patient   fluticasone (VERAMYST) 27.5 MCG/SPRAY nasal spray Spray 2 sprays into both nostrils daily.   Reported, Patient   INCRUSE ELLIPTA 62.5 MCG/INH Inhaler Inhale 1 puff into the lungs daily   Unknown, Entered By History   lidocaine 2 % injection Draw up 10mL of LidoCANE 2% AND mix with 3mL of Sodium Bicarb. Put in sterile cup for bladder instillation.   Unknown, Entered By History   Multiple Vitamins-Minerals (MULTIVITAMIN ADULT PO) Take 1 tablet by mouth daily   Reported, Patient   omeprazole (PRILOSEC) 40 MG DR capsule Take 40 mg by mouth every morning    Reported, Patient   pentosan polysulfate (ELMIRON) 100 MG capsule Take 100 mg by mouth 2 times daily    Reported, Patient   raloxifene (EVISTA) 60 MG tablet Take 1 tablet by mouth daily   Unknown, Entered By History   RESTASIS 0.05 % ophthalmic emulsion Place 1 drop into both eyes 2 times daily   Unknown, Entered By History   rosuvastatin (CRESTOR) 5 MG tablet Take 5 mg by mouth At Bedtime   Unknown, Entered By History   sodium bicarbonate 8.4 % injection DRAW UP 3 ML, FOR USE IN HOME BLADDER INSTILLATIONS.   Unknown, Entered By History   traZODone (DESYREL) 50 MG tablet Take 50 mg by mouth At Bedtime    Reported, Patient   verapamil ER (CALAN-SR) 120 MG CR tablet Take 120 mg by mouth daily   Unknown, Entered By History   Vitamin D (Cholecalciferol) 25 MCG (1000 UT) TABS Take 1,000 Units by mouth daily    Reported, Patient          Review of Systems:   A Comprehensive greater than 10 system review of systems was carried out.  Pertinent positives and negatives are noted above.  Otherwise negative for contributory information.          Physical Exam:   Blood pressure 121/65, pulse 77, temperature 98  F (36.7  C), temperature source Oral, resp. rate 19, height 1.702 m (5' 7\"), weight 71.7 kg (158 lb), SpO2 96 %.    GENERAL: healthy, alert and no distress  EYES: Eyes grossly " normal to inspection, extraocular movements - intact, and PERRL  HENT: ear canals- normal; TMs- normal; Nose- normal; Mouth- no ulcers, no lesions  NECK: no tenderness, no adenopathy, no asymmetry, no masses, no stiffness; thyroid- normal to palpation  RESP: lungs clear to auscultation - no rales, no rhonchi, no wheezes  CV: regular rates and rhythm, normal S1 S2, no S3 or S4, no murmur, click or rub and peripheral pulses strong  ABDOMEN: soft, no tenderness, no  hepatosplenomegaly, no masses, normal bowel sounds  MS: extremities- no gross deformities noted, no edema  SKIN: no suspicious lesions, no rashes  NEURO: strength and tone- normal, sensory exam- grossly normal, mentation- intact, speech- normal, reflexes- symmetric  PSYCH: Alert and oriented times 3; coherent speech. Affect is normal.         Data:     EKG demonstrates: rate of 70 bpm and NSR with nonspecific ST abnormality.    Results for orders placed or performed during the hospital encounter of 08/21/20   Chest XR,  PA & LAT     Status: None    Narrative    CHEST TWO VIEWS   8/21/2020 1:07 PM     HISTORY: Chest pain.    COMPARISON: None available.      Impression    IMPRESSION: PA and lateral views of the chest were obtained.  Cardiomediastinal silhouette is within normal limits. No suspicious  focal pulmonary opacities. No significant pleural effusion or  pneumothorax. Bilateral calcified breast implants.    TERESA BURKS MD   CBC with platelets differential     Status: Abnormal   Result Value Ref Range    WBC 5.8 4.0 - 11.0 10e9/L    RBC Count 4.02 3.8 - 5.2 10e12/L    Hemoglobin 13.1 11.7 - 15.7 g/dL    Hematocrit 40.2 35.0 - 47.0 %     78 - 100 fl    MCH 32.6 26.5 - 33.0 pg    MCHC 32.6 31.5 - 36.5 g/dL    RDW 14.5 10.0 - 15.0 %    Platelet Count 116 (L) 150 - 450 10e9/L    Diff Method Automated Method     % Neutrophils 65.9 %    % Lymphocytes 26.0 %    % Monocytes 5.9 %    % Eosinophils 1.4 %    % Basophils 0.3 %    % Immature  Granulocytes 0.5 %    Nucleated RBCs 0 0 /100    Absolute Neutrophil 3.8 1.6 - 8.3 10e9/L    Absolute Lymphocytes 1.5 0.8 - 5.3 10e9/L    Absolute Monocytes 0.3 0.0 - 1.3 10e9/L    Absolute Eosinophils 0.1 0.0 - 0.7 10e9/L    Absolute Basophils 0.0 0.0 - 0.2 10e9/L    Abs Immature Granulocytes 0.0 0 - 0.4 10e9/L    Absolute Nucleated RBC 0.0    Basic metabolic panel     Status: Abnormal   Result Value Ref Range    Sodium 140 133 - 144 mmol/L    Potassium 3.8 3.4 - 5.3 mmol/L    Chloride 109 94 - 109 mmol/L    Carbon Dioxide 26 20 - 32 mmol/L    Anion Gap 5 3 - 14 mmol/L    Glucose 112 (H) 70 - 99 mg/dL    Urea Nitrogen 14 7 - 30 mg/dL    Creatinine 0.66 0.52 - 1.04 mg/dL    GFR Estimate 84 >60 mL/min/[1.73_m2]    GFR Estimate If Black >90 >60 mL/min/[1.73_m2]    Calcium 9.3 8.5 - 10.1 mg/dL   Troponin I (now)     Status: None   Result Value Ref Range    Troponin I ES <0.015 0.000 - 0.045 ug/L       Zamzam Nagy PA-C

## 2020-08-21 NOTE — ED PROVIDER NOTES
History     Chief Complaint:  Chest Pain       HPI   Suzanne Henderson is a 80 year old female who presents with chest pain. The patient developed mid chest pain last night while she was watching TV. She states that the chest pain radiated up to her neck and back. She denies having any sweating, nausea, or vomiting with the chest pain. The chest pain is not constant but returned this morning when she woke up. She took 1 nitroglycerin at 0730 which made her chest pain better but she would still like to be evaluated. She denies any shortness of breath. She notes having severe GERD but states that her chest pain feels different from her GERD.    Cardiac/PE/DVT Risk Factors:  History of hypertension - no  History of hyperlipidemia - no  History of diabetes - no  History of smoking - yes  Personal history of PE/DVT - no  Family history of PE/DVT - no  Family history of heart complications - no  Recent travel - no  Recent surgery - no  Other immobilizations - no  Cancer - no     Allergies:  Yellow Jacket Venom [Venomil Honey Bee]  Sulfa Drugs  Vancomycin     Medications:    Atorvastatin   Alendronate   escitalopram oxalate   Omeprazole  elmiron   Trazodone   Verapamil     Past Medical History:    Allergic rhinitis due to allergen   Arrhythmia   Arthritis   Asthma   Bladder dysfunction   Bladder pain   Chronic interstitial cystitis   Colon polyps   COPD (chronic obstructive pulmonary disease) (H)   External hemorrhoids   Fibromyalgia   GERD (gastroesophageal reflux disease)   Hiatal hernia   Lumbar radiculopathy, right   Multiple pulmonary nodules   Osteopenia   Ptosis   Sleep apnea   Tachycardia   Urticaria     Past Surgical History:    Appendectomy   Cervical discectomy   Breast surgery   Breast implants    Ptosis repair bilateral   T&A  Tubal ligation   Eye surgery     Family History:    Family history reviewed. No pertinent family history.      Social History:  Smoking Status: former  Smokeless Tobacco: Never  "Used  Alcohol Use: Yes  Drug Use: No  PCP: Servando Martin     Review of Systems   Constitutional: Negative for diaphoresis.   Respiratory: Negative for shortness of breath.    Cardiovascular: Positive for chest pain.   Gastrointestinal: Negative for nausea and vomiting.   All other systems reviewed and are negative.      Physical Exam     Patient Vitals for the past 24 hrs:   BP Temp Temp src Pulse Resp SpO2 Height Weight   08/21/20 1230 -- -- -- 92 13 96 % -- --   08/21/20 1215 136/82 -- -- 81 12 95 % -- --   08/21/20 1205 139/79 98  F (36.7  C) Oral 84 16 96 % 1.702 m (5' 7\") 71.7 kg (158 lb)        Physical Exam  General:   Well-nourished   Speaking in full sentences  Eyes:   Conjunctiva without injection or scleral icterus   PERRL  ENT:   Moist mucous membranes   Posterior oropharynx clear without erythema or exudate   Nares patent   Pinnae normal  Neck:   Full ROM   No stiffness appreciated  Resp:   Lungs CTAB   No crackles, wheezing or audible rubs   Good air movement  CV:    Normal rate, regular rhythm   S1 and S2 present   No murmur, gallop or rub  GI:   BS present   Abdomen soft without distention   Non-tender to light and deep palpation   No guarding or rebound tenderness  Skin:   Warm, dry, well perfused   No rashes or open wounds on exposed skin  MSK:   Moves all extremities   No focal deformities or swelling  Neuro:   Alert   Answers questions appropriately   Moves all extremities equally   Gait stable  Psych:   Normal affect, normal mood       Emergency Department Course   ECG:  ECG taken at 1212  Normal sinus rhythm  Nonspecific ST abnormality   Abnormal ECG  Rate 78 bpm. NH interval 188 ms. QRS duration 78 ms. QT/QTc 388/442 ms. P-R-T axes 66 46 62.     Imaging:  Radiology findings were communicated with the patient who voiced understanding of the findings.    Chest XR,  PA & LAT  Preliminary Result  IMPRESSION: PA and lateral views of the chest were obtained.  Cardiomediastinal silhouette is within " normal limits. No suspicious  focal pulmonary opacities. No significant pleural effusion or  pneumothorax. Bilateral calcified breast implants.  Reading per radiology     Laboratory:  Laboratory findings were communicated with the patient who voiced understanding of the findings.    CBC:  WBC 5.8, HGB 13.1,  (L), o/w WNL     BMP: Glucose 112 (H), o/w WNL (Creatinine: 0.66)     Troponin(1230):  <0.015      Asymptomatic COVID-19 Virus (Coronavirus) by PCR Nasopharyngeal swab: pending      Interventions:  1228 Nitrostat 0.4 mg sublingual     Emergency Department Course:  Past medical records, nursing notes, and vitals reviewed.    1212 I performed an exam of the patient as documented above.    IV was inserted and blood was drawn for laboratory testing, results above.     The patient was sent for imaging while in the emergency department, results above.       1311 Patient reports that the chest pain has completely resolved.     1350 Patient rechecked and updated.     Discussed patient with Dr. Glass who agreed to share service on the patient. He agrees with plan for admission.     1437 I spoke with Joslyn BURTON for Dr. Schneider of the Hospitalist service from [facility] regarding patient's presentation, findings, and plan of care.      Findings and plan explained to the Patient who consents to admission. Discussed the patient with Joslyn Schneider, who will admit the patient to a observation unit bed for further monitoring, evaluation, and treatment.      Impression & Plan   Covid-19  Suzanne Henderson was evaluated during a global COVID-19 pandemic, which necessitated consideration that the patient might be at risk for infection with the SARS-CoV-2 virus that causes COVID-19.   Applicable protocols for evaluation were followed during the patient's care.   COVID-19 was considered as part of the patient's evaluation. The plan for testing is:  a test was obtained during this visit.    Medical Decision  Making:  Suzanne Henderson is a 80 year old female with medical history including GERD who presents with chest pain.  Vitals normal besides slight tachycardia. Patient well appearing. Differential of chest pain is broad and includes ACS, aortic dissection, PE, pneumonia, pleural effusion, pericarditis, pleuritis, anxiety, GERD, etc. There is no clinical, laboratory, or radiographic evidence of pulmonary embolism, AAA, aortic dissection, pneumonia or pneumothorax.  EKG reveals nonspecific ST changed without evidence of acute ischemia. Initial troponin negative. Concern for anginal equivalent and plan for admission for further monitoring, serial troponins and provacative testing. The patient is pain free after nitroglycerin and aspirin.  I will admit the patient  to medicine service for further workup. Discussed plan with Joslyn BURTON who graciously accepted care of the patient. Patient agrees with the plan and all questions/concerns addressed prior to admission.      Discharge Diagnosis:    ICD-10-CM    1. Chest pain  R07.9        Disposition:  Admitted.     Scribe Disclosure:  I, Matthias Caputo, am serving as a scribe at 12:12 PM on 8/21/2020 to document services personally performed by Cristal Hill PA-C based on my observations and the provider's statements to me.      8/21/2020   Cristal Hill PA-C Luell, Amy Jolene, PA-C  08/21/20 1640

## 2020-08-21 NOTE — ED PROVIDER NOTES
Emergency Department Attending Supervision Note  8/21/2020  1:38 PM      I evaluated this patient in conjunction with Cristal Hill PA-C       Briefly, the patient presented with left-sided chest pressure.  She does have a history of GERD causing chest pain before this felt different.  The pain was more intense and radiated up into her neck last night she said it reoccurred this morning and therefore she presented to the ER.  Patient denies shortness of breath diaphoresis or palpitations.      On my exam  General: The patient is alert, in no respiratory distress.    HENT: No neck tenderness    Cardiovascular: Regular rate and rhythm. Good pulses in all four extremities. Normal capillary refill and skin turgor.     Respiratory: Lungs are clear. No nasal flaring. No retractions. No wheezing, no crackles.    Gastrointestinal: Abdomen soft. No guarding, no rebound. No palpable hernias.     Musculoskeletal: No gross deformity.     Skin: No rashes or petechiae.     Neurologic: The patient is alert and intact    Lymphatic: No cervical adenopathy. No lower extremity swelling.    Psychiatric: The patient is non-tearful.      Results:  Chest XR,  PA & LAT   Preliminary Result   IMPRESSION: PA and lateral views of the chest were obtained.   Cardiomediastinal silhouette is within normal limits. No suspicious   focal pulmonary opacities. No significant pleural effusion or   pneumothorax. Bilateral calcified breast implants.        Labs Ordered and Resulted from Time of ED Arrival Up to the Time of Departure from the ED   CBC WITH PLATELETS DIFFERENTIAL - Abnormal; Notable for the following components:       Result Value    Platelet Count 116 (*)     All other components within normal limits   BASIC METABOLIC PANEL - Abnormal; Notable for the following components:    Glucose 112 (*)     All other components within normal limits   TROPONIN I   Troponin less than 0.015  EKG shows nonspecific ST segment in V3 new compared from  previous    ED course: Patient is pain improved with nitroglycerin    My impression is chest pain concerning for ACS.  There is no current signs of STEMI.  It is worrisome that she responded to nitroglycerin however esophageal spasm could improve with this as well.  She will require admission for further work-up and risk stratification.  There is no ongoing signs of ischemia.        Diagnosis  1. Chest pain            Adán Glass MD Farnan, Christopher M, MD  08/21/20 2812

## 2020-08-22 ENCOUNTER — APPOINTMENT (OUTPATIENT)
Dept: CARDIOLOGY | Facility: CLINIC | Age: 80
End: 2020-08-22
Attending: PHYSICIAN ASSISTANT
Payer: MEDICARE

## 2020-08-22 VITALS
HEART RATE: 76 BPM | DIASTOLIC BLOOD PRESSURE: 53 MMHG | RESPIRATION RATE: 18 BRPM | SYSTOLIC BLOOD PRESSURE: 122 MMHG | OXYGEN SATURATION: 94 % | WEIGHT: 152.6 LBS | BODY MASS INDEX: 23.95 KG/M2 | HEIGHT: 67 IN | TEMPERATURE: 97.1 F

## 2020-08-22 LAB
CHOLEST SERPL-MCNC: 160 MG/DL
HDLC SERPL-MCNC: 81 MG/DL
INTERPRETATION ECG - MUSE: NORMAL
LDLC SERPL CALC-MCNC: 67 MG/DL
NONHDLC SERPL-MCNC: 79 MG/DL
SARS-COV-2 RNA SPEC QL NAA+PROBE: NOT DETECTED
SPECIMEN SOURCE: NORMAL
TRIGL SERPL-MCNC: 59 MG/DL
TROPONIN I SERPL-MCNC: <0.015 UG/L (ref 0–0.04)

## 2020-08-22 PROCEDURE — 93018 CV STRESS TEST I&R ONLY: CPT | Performed by: INTERNAL MEDICINE

## 2020-08-22 PROCEDURE — 25000132 ZZH RX MED GY IP 250 OP 250 PS 637: Mod: GY | Performed by: PHYSICIAN ASSISTANT

## 2020-08-22 PROCEDURE — 93350 STRESS TTE ONLY: CPT | Mod: TC

## 2020-08-22 PROCEDURE — 99217 ZZC OBSERVATION CARE DISCHARGE: CPT | Performed by: PHYSICIAN ASSISTANT

## 2020-08-22 PROCEDURE — 93321 DOPPLER ECHO F-UP/LMTD STD: CPT | Mod: 26 | Performed by: INTERNAL MEDICINE

## 2020-08-22 PROCEDURE — 93350 STRESS TTE ONLY: CPT | Mod: 26 | Performed by: INTERNAL MEDICINE

## 2020-08-22 PROCEDURE — 93325 DOPPLER ECHO COLOR FLOW MAPG: CPT | Mod: 26 | Performed by: INTERNAL MEDICINE

## 2020-08-22 PROCEDURE — 80061 LIPID PANEL: CPT | Performed by: PHYSICIAN ASSISTANT

## 2020-08-22 PROCEDURE — G0378 HOSPITAL OBSERVATION PER HR: HCPCS

## 2020-08-22 PROCEDURE — 93016 CV STRESS TEST SUPVJ ONLY: CPT | Performed by: INTERNAL MEDICINE

## 2020-08-22 PROCEDURE — 36415 COLL VENOUS BLD VENIPUNCTURE: CPT | Performed by: PHYSICIAN ASSISTANT

## 2020-08-22 RX ORDER — FAMOTIDINE 20 MG/1
20 TABLET, FILM COATED ORAL EVERY MORNING
Qty: 30 TABLET | Refills: 0 | Status: SHIPPED | OUTPATIENT
Start: 2020-08-22

## 2020-08-22 RX ORDER — SUCRALFATE 1 G/1
1 TABLET ORAL 3 TIMES DAILY PRN
Qty: 60 TABLET | Refills: 0 | Status: SHIPPED | OUTPATIENT
Start: 2020-08-22

## 2020-08-22 RX ORDER — SUCRALFATE ORAL 1 G/10ML
1 SUSPENSION ORAL
Qty: 420 ML | Refills: 0 | Status: SHIPPED | OUTPATIENT
Start: 2020-08-22 | End: 2020-08-22

## 2020-08-22 RX ADMIN — SUCRALFATE 1 G: 1 SUSPENSION ORAL at 08:36

## 2020-08-22 RX ADMIN — ALBUTEROL SULFATE 2 PUFF: 90 INHALANT RESPIRATORY (INHALATION) at 08:38

## 2020-08-22 RX ADMIN — OMEPRAZOLE 40 MG: 20 CAPSULE, DELAYED RELEASE ORAL at 08:36

## 2020-08-22 RX ADMIN — ASPIRIN 81 MG: 81 TABLET, COATED ORAL at 08:36

## 2020-08-22 NOTE — DISCHARGE SUMMARY
[facility] Outpatient / Observation Unit  Discharge Summary        Suzanne Henderson MRN# [id number]   YOB: 1940 Age: 80 year old     Date of Admission:  8/21/2020  Date of Discharge:  8/22/2020  Admitting Physician:  Selvin Schneider MD  Discharge Physician: Leesa Rose PA-C, SILVIANO  Discharging Service: Hospitalist      Primary Provider: Servando Martin  Primary Care Physician Phone Number: [phone]         Primary Discharge Diagnoses:    Suzanne Hendreson is a 80 year old female with a PMH significant for h/o SVT, asthma, COPD, interstitial cystitis, fibromyalgia, GERD, previous tobacco abuse, and HLD who was admitted to observation for chest pain on 8/21/20.      Work up in ED revealed: VSS, BMP unremarkable, negative troponin, glucose 112, platelet count of 116 otherwise CBC WNL, chest x-ray shows no suspicious focal pulmonary opacities, pleural effusion, or pneumothorax, bilateral calcified breast implants, and EKG shows rate of 70 bpm and NSR with nonspecific ST abnormality.     Patient was registered to observation for further evaluation and to rule out possible ACS.      #Acute chest pain: serial troponins were negative. Telemetry overnight showed NSR with VR in the 70-80s. A stress ECHO was then done that was negative for inducible ischemia.   - symptoms are most c/w GERD.  - patient already takes Prilosec 40mg at night. Will add in Pepcid in the AM and Carafate 3-4 times a day as needed.      #h/o SVT: diagnosed in 1998, originally on beta blocker but this was stopped in 2016 after receiving allergy shots following a severe allergic reaction to a yellow jacket sting. Previously evaluated by Dr. Bautista of cardiology at [facility] in [place].   - continue PTA Verapamil      #HLD: continue PTA Rosuvastatin, repeat lipid panel this AM looks good     #Asthma   #COPD: no recent or current exacerbations but reports increased cough due to seasonal allergies this season.   -  continue PTA Advair and Incruse Ellipta inhalers     #Interstitial cystitis: follows with Dr. Vega of Urology for management      #Anxiety: resume PTA Lexapro and Trazodone for sleep      #Previous tobacco abuse: smoked about 3 cigarettes per day for 13 years, quit in 1973        Secondary Discharge Diagnoses:     Past Medical History:   Diagnosis Date     Allergic rhinitis due to allergen     Dust, Ragweed     Arrhythmia     tachyarrythmia     Arthritis      Asthma     states coughs alot and has been told at one time had asthma and used inhaler     Bladder dysfunction      Bladder pain      Chronic interstitial cystitis      Colon polyps      COPD (chronic obstructive pulmonary disease) (H)     mild     External hemorrhoids      Fibromyalgia      Gastro-oesophageal reflux disease      GERD (gastroesophageal reflux disease)      Hiatal hernia      Hiatal hernia      Lumbar radiculopathy, right      Multiple pulmonary nodules      Osteopenia      Pain      PONV (postoperative nausea and vomiting)      Ptosis      Pulmonary nodules      Sleep apnea      Tachycardia      Urticaria                 Code Status:      DNR / DNI        Brief Hospital Summary:        Reason for your hospital stay      You were registered to the observation unit for chest pain. Your initial   evaluation, including labs and imaging, were normal. You were monitored on   telemetry overnight (which showed sinus rhythm) and your heart enzymes   were negative. A stress ECHO was also done which was negative. Your   symptoms were thought to be related to GERD.             Please refer to initial admission history and physical for further details.   Briefly, Suzanne Henderson was admitted on 8/21/2020 for concerns of acute chest pain.   Initial work up in the ED did not reveal evidence of STEMI or findings consistent with unstable angina or acute coronary ischemia. Pt was registered to the Observation Unit for further evaluation.   Pt ruled out with  serial troponins, underwent Stress Echo  that did not show evidence of significant coronary ischemia. Labs were reviewed and significant results addressed. On the day of discharge, pt was pain free, with no complaints of pain. Medications were reviewed and adjustments made as necessary. Pt is instructed to follow up as below.           Significant Lab During Hospitalization:      No results for input(s): PH, PHV, PO2, PO2V, SAT, PCO2, PCO2V, HCO3, HCO3V in the last 168 hours.  Recent Labs   Lab 08/21/20  1230   WBC 5.8   HGB 13.1   HCT 40.2      *          Lab Results   Component Value Date     08/21/2020     07/22/2015    Lab Results   Component Value Date    CHLORIDE 109 08/21/2020    CHLORIDE 104 07/22/2015    Lab Results   Component Value Date    BUN 14 08/21/2020    BUN 8 07/22/2015      Lab Results   Component Value Date    POTASSIUM 3.8 08/21/2020    POTASSIUM 3.9 07/22/2015    Lab Results   Component Value Date    CO2 26 08/21/2020    CO2 28 07/22/2015    Lab Results   Component Value Date    CR 0.66 08/21/2020    CR 0.64 07/22/2015        No results for input(s): CULT in the last 168 hours.  Recent Labs   Lab 08/21/20  1230      POTASSIUM 3.8   CHLORIDE 109   CO2 26   ANIONGAP 5   *   BUN 14   CR 0.66   GFRESTIMATED 84   GFRESTBLACK >90   BROOK 9.3     No results for input(s): SED, CRP in the last 168 hours.  No results for input(s): INR in the last 168 hours.  No results for input(s): LACT in the last 168 hours.  No results for input(s): LIPASE in the last 168 hours.  Recent Labs   Lab 08/22/20  0521   CHOL 160   HDL 81   LDL 67   TRIG 59     No results for input(s): TSH in the last 168 hours.  Recent Labs   Lab 08/21/20  2358 08/21/20  1838 08/21/20  1230   TROPI <0.015 <0.015 <0.015     No results for input(s): COLOR, APPEARANCE, URINEGLC, URINEBILI, URINEKETONE, SG, UBLD, URINEPH, PROTEIN, UROBILINOGEN, NITRITE, LEUKEST, RBCU, WBCU in the last 168 hours.              Significant Imaging During Hospitalization:      Recent Results (from the past 48 hour(s))   Chest XR,  PA & LAT    Narrative    CHEST TWO VIEWS   2020 1:07 PM     HISTORY: Chest pain.    COMPARISON: None available.      Impression    IMPRESSION: PA and lateral views of the chest were obtained.  Cardiomediastinal silhouette is within normal limits. No suspicious  focal pulmonary opacities. No significant pleural effusion or  pneumothorax. Bilateral calcified breast implants.    TERESA BURKS MD   Echo Stress Echocardiogram    Narrative    [id number]  [id number]  [id number]  [id number]^FLORESITA^MILADYS^VIKA           [facility]  Echocardiography Laboratory  [address] [place] [address]        Name: IAN EATON  MRN: [id number]  : 1940  Study Date: 2020 07:27 AM  Age: 80 yrs  Gender: Female  Patient [facility]: [facility]  Reason For Study: Chest Pain  History: HTN,High cholesterol  Ordering Physician: MILADYS CANAS  Referring Physician: ORLANDO BONILLA  Performed By: Florecita Shaw     BSA: 1.8 m2  Height: 67 in  Weight: 152 lb  HR: 72  BP: 110/60 mmHg  Medications: Verapamil,Crestor  _____________________________________________________________________________  __        Procedure  Stress Echo Complete.  _____________________________________________________________________________  __        Interpretation Summary  The patient exercised 9:01 according to modified alejandra protocol.  Patient had 1/10 chest pain that did not change with exercise.  The stress EKG is non-diagnostic due to pre-existing EKG abnomalities.  Left ventricular cavity size decreases with exercise.  Global LV systolic function augments with exercise.  Normal resting wall motion and no stress-induced wall motion abnormality.  This was a normal stress echocardiogram with no evidence of stress-induced  ischemia.  _____________________________________________________________________________  __     Stress  Pt had 1/10  chest discomfort at rest that did not resolve or worsen with  exercise.  RPP 46981.  There was a normal BP response to exercise.  Exercise was stopped due to fatigue.  A moderate workload was achieved.  The patient exercised 9:01.  Target Heart Rate was achieved.  Arrhythmia noted in recovery: occasional PVC's.  The stress EKG is non-diagnostic due to pre-existing EKG abnomalities.  The visual ejection fraction is estimated at >70%.  Left ventricular cavity size decreases with exercise.  Global LV systolic function augments with exercise.  Normal resting wall motion and no stress-induced wall motion abnormality.  This was a normal stress echocardiogram with no evidence of stress-induced  ischemia.     Baseline  sinus rhyth, with non specific ST-t changes.  No regional wall motion abnormalities noted.  The visual ejection fraction is estimated at 55-60%.     Stress Results         Protocol:  MODIFIED GABRIEL        Maximum Predicted HR:   142 bpm         Target HR: 121 bpm               % Maximum Predicted HR: 94 %                        Stage  DurationHeart Rate  BP    Comment                             (mm:ss)   (bpm)                    Stage 0   3:00      109   120/60                    Stage 1   3:00      116   128/60                    Stage 2   3:01      134   134/60RPP: 23644                   RecoveryR  6:00      96    108/60               Stress Duration:   9:01 mm:ss *        Recovery Time: 6:00 mm:ss         Maximum Stress HR: 134 bpm *           METS:          7     Left Ventricle  Proximal septal thickening is noted. Left ventricular systolic function is  normal. The visual ejection fraction is estimated at 55-60%. No regional wall  motion abnormalities noted.        Mitral Valve  There is trace mitral regurgitation.     Tricuspid Valve  There is trace tricuspid regurgitation.     Aortic Valve  The aortic valve is trileaflet. There is trace aortic regurgitation. No aortic  stenosis is present.      Pulmonic Valve  There is trace pulmonic valvular regurgitation.     Vessels  The ascending aorta is Mildly dilated.     _____________________________________________________________________________  __  MMode/2D Measurements & Calculations  asc Aorta Diam: 3.7 cm                    _____________________________________________________________________________  __        Report approved by: Diane Williamson 08/22/2020 09:04 AM                   Pending Results:        Unresulted Labs Ordered in the Past 30 Days of this Admission     Date and Time Order Name Status Description    8/21/2020 1438 Asymptomatic COVID-19 Virus (Coronavirus) by PCR In process               Consultations This Hospital Stay:      No consultations were requested during this admission         Discharge Instructions and Follow-Up:      Follow-up Appointments     Follow-up and recommended labs and tests       1. Start taking Pepcid 20mg every morning on an empty stomach.   - Continue taking Prilosec at night  - Add in Carfate suspension 3-4 times a day if the above two medications   do not control your GERD symptoms within the next 2 weeks.     2. Follow up with your family doctor within the next 2-4 weeks for   recheck.               Discharge Disposition:      Discharged to home         Discharge Medications:        Current Discharge Medication List      START taking these medications    Details   famotidine (PEPCID) 20 MG tablet Take 1 tablet (20 mg) by mouth every morning  Qty: 30 tablet, Refills: 0    Associated Diagnoses: Gastroesophageal reflux disease without esophagitis      sucralfate (CARAFATE) 1 GM/10ML suspension Take 10 mLs (1 g) by mouth 4 times daily (before meals and nightly)  Qty: 420 mL, Refills: 0    Associated Diagnoses: Gastroesophageal reflux disease without esophagitis         CONTINUE these medications which have NOT CHANGED    Details   ADVAIR DISKUS 250-50 MCG/DOSE inhaler Inhale 1 puff into the lungs 2 times daily  "     albuterol (PROAIR HFA/PROVENTIL HFA/VENTOLIN HFA) 108 (90 Base) MCG/ACT inhaler Inhale 2 puffs into the lungs every 6 hours as needed       Calcium Citrate-Vitamin D (CALCIUM + D PO) Take 2 tablets by mouth daily       EPINEPHrine 0.3 MG/0.3ML injection 2-pack Inject 0.3 mg into the muscle as needed for anaphylaxis      escitalopram (LEXAPRO) 10 MG tablet Take 10 mg by mouth daily       fluticasone (VERAMYST) 27.5 MCG/SPRAY nasal spray Spray 2 sprays into both nostrils daily.      INCRUSE ELLIPTA 62.5 MCG/INH Inhaler Inhale 1 puff into the lungs daily      Multiple Vitamins-Minerals (MULTIVITAMIN ADULT PO) Take 1 tablet by mouth daily      omeprazole (PRILOSEC) 40 MG DR capsule Take 40 mg by mouth every morning       raloxifene (EVISTA) 60 MG tablet Take 1 tablet by mouth daily      RESTASIS 0.05 % ophthalmic emulsion Place 1 drop into both eyes 2 times daily      rosuvastatin (CRESTOR) 5 MG tablet Take 5 mg by mouth At Bedtime      traZODone (DESYREL) 50 MG tablet Take 50 mg by mouth At Bedtime       verapamil ER (CALAN-SR) 120 MG CR tablet Take 120 mg by mouth daily      Vitamin D (Cholecalciferol) 25 MCG (1000 UT) TABS Take 1,000 Units by mouth daily       lidocaine 2 % injection Draw up 10mL of LidoCANE 2% AND mix with 3mL of Sodium Bicarb. Put in sterile cup for bladder instillation.      sodium bicarbonate 8.4 % injection DRAW UP 3 ML, FOR USE IN HOME BLADDER INSTILLATIONS.                 Allergies:         Allergies   Allergen Reactions     Yellow Jacket Venom [Venomil Honey Bee] Anaphylaxis     Sulfa Drugs Hives     Vancomycin Hives           Condition and Physical on Discharge:      Discharge condition: Stable   Vitals: Blood pressure 122/64, pulse 81, temperature 96.8  F (36  C), temperature source Oral, resp. rate 18, height 1.702 m (5' 7\"), weight 69.2 kg (152 lb 9.6 oz), SpO2 95 %.  152 lbs 9.6 oz      GENERAL:  Comfortable.  PSYCH: pleasant, oriented, No acute distress.  HEENT:  PERRLA. Normal " conjunctiva, normal hearing, nasal mucosa and Oropharynx are normal.  NECK:  Supple, no neck vein distention, adenopathy or bruits, normal thyroid.  HEART:  Normal S1, S2 with no murmur, no pericardial rub, gallops or S3 or S4.  LUNGS:  Clear to auscultation, normal Respiratory effort. No wheezing, rales or ronchi.  ABDOMEN:  Soft, no hepatosplenomegaly, normal bowel sounds. Non-tender, non distended.   EXTREMITIES:  No pedal edema, +2 pulses bilateral and equal.  SKIN:  Dry to touch, No rash, wound or ulcerations.  NEUROLOGIC:  CN 2-12 intact, BL 5/5 symmetric upper and lower extremity strength, sensation is intact with no focal deficits.

## 2020-08-22 NOTE — PLAN OF CARE
PRIMARY DIAGNOSIS: CHEST PAIN  OUTPATIENT/OBSERVATION GOALS TO BE MET BEFORE DISCHARGE:  1. Ruled out acute coronary syndrome (negative or stable Troponin):  Yes, trops neg x2  2. Pain Status: Pain free.  3. Appropriate provocative testing performed: Yes  - Stress Test Procedure: Stress echo in the morning  - Interpretation of cardiac rhythm per telemetry tech: SR with hr in 70's    4. Cleared by Consultants (if applicable):N/A  5. Return to near baseline physical activity: Yes  Discharge Planner Nurse   Safe discharge environment identified: Yes  Barriers to discharge: Yes       Entered by: Zoe Alan 08/21/2020 8:03 PM     Vitals are Temp: 97.1  F (36.2  C) Temp src: Oral BP: 119/67 Pulse: 65   Resp: 16 SpO2: 95 %.  Patient is Alert and Oriented x4. They are independent with no assistive devices .  Pt is a Regular diet with no caffiene. At midnight, pt will be clears with no caffiene.  They are denying pain, chest pain, n/v, and numbness/tingling.  Left lower leg and ankle slightly edematous, baseline per pt statement. Patient is Saline locked. Will continue to monitor and provide cares.     Please review provider order for any additional goals.   Nurse to notify provider when observation goals have been met and patient is ready for discharge.

## 2020-08-22 NOTE — PLAN OF CARE
PRIMARY DIAGNOSIS: CHEST PAIN  OUTPATIENT/OBSERVATION GOALS TO BE MET BEFORE DISCHARGE:  1. Ruled out acute coronary syndrome (negative or stable Troponin):  Yes, trops neg x3  2. Pain Status: Pain free.  3. Appropriate provocative testing performed: Yes  - Stress Test Procedure: Stress echo in the morning  - Interpretation of cardiac rhythm per telemetry tech: SR with hr 74    4. Cleared by Consultants (if applicable):N/A  5. Return to near baseline physical activity: Yes  Discharge Planner Nurse   Safe discharge environment identified: Yes  Barriers to discharge: Yes       Entered by: Zoe Alan 08/22/2020 4:58 AM     Vitals are Temp: 96.2  F (35.7  C) Temp src: Oral BP: 120/54 Pulse: 74   Resp: 16 SpO2: 94 %.  Patient is Alert and Oriented x4. They are independent with no assistive devices .  Pt is a clears liquid diet with no caffiene.  They are denying pain, chest pain, n/v, and numbness/tingling.  Left lower leg and ankle slightly edematous, baseline per pt statement. Patient is Saline locked. Will continue to monitor and provide cares.     Please review provider order for any additional goals.   Nurse to notify provider when observation goals have been met and patient is ready for discharge.

## 2020-08-22 NOTE — PROGRESS NOTES
"PRIMARY DIAGNOSIS: CHEST PAIN  OUTPATIENT/OBSERVATION GOALS TO BE MET BEFORE DISCHARGE:  1. Ruled out acute coronary syndrome (negative or stable Troponin):  Yes  2. Pain Status: Pain free.  3. Appropriate provocative testing performed: Yes  - Stress Test Procedure: Stress Echo   - Interpretation of cardiac rhythm per telemetry tech: SR HR 70's     4. Cleared by Consultants (if applicable):No  5. Return to near baseline physical activity: Yes     Discharge Planner Nurse      Safe discharge environment identified: Yes  Barriers to discharge: Yes       Entered by: Mary Pike 08/22/2020 0840     Please review provider order for any additional goals.   Nurse to notify provider when observation goals have been met and patient is ready for discharge.    Patient is alert and oriented x4. VS WNL and documented on the FS. Lung sounds clear in all lobes and patient is on RA. Denies SOB. Active bowel sounds. Patient denies any pain, urgency, and frequency when voiding. Denies chest pain. SL. Regular diet. Tele in place and SR. Independent in room. Plan: Stress echo completed and plan to discharge today.    /53 (BP Location: Right arm)   Pulse 76   Temp 97.1  F (36.2  C) (Oral)   Resp 18   Ht 1.702 m (5' 7\")   Wt 69.2 kg (152 lb 9.6 oz)   SpO2 94%   BMI 23.90 kg/m          "

## 2020-08-22 NOTE — PLAN OF CARE
"Patient's After Visit Summary was reviewed with patient and/or spouse.   Patient verbalized understanding of After Visit Summary, recommended follow up and was given an opportunity to ask questions.   Discharge medications sent home with patient/family: Patient will pick scripts up from her pharmacy.    Discharged with spouse and nurse with all belongings. Patient medically cleared to discharge.  will transport home. Denies pain.   /53 (BP Location: Right arm)   Pulse 76   Temp 97.1  F (36.2  C) (Oral)   Resp 18   Ht 1.702 m (5' 7\")   Wt 69.2 kg (152 lb 9.6 oz)   SpO2 94%   BMI 23.90 kg/m    OBSERVATION patient END time: 1058        "

## 2020-08-22 NOTE — PLAN OF CARE
PRIMARY DIAGNOSIS: CHEST PAIN  OUTPATIENT/OBSERVATION GOALS TO BE MET BEFORE DISCHARGE:  1. Ruled out acute coronary syndrome (negative or stable Troponin):  Yes, trops neg x3  2. Pain Status: Pain free.  3. Appropriate provocative testing performed: Yes  - Stress Test Procedure: Stress echo in the morning  - Interpretation of cardiac rhythm per telemetry tech: SR with hr 85    4. Cleared by Consultants (if applicable):N/A  5. Return to near baseline physical activity: Yes  Discharge Planner Nurse   Safe discharge environment identified: Yes  Barriers to discharge: Yes       Entered by: Zoe Alan 08/22/2020 12:33 AM     Vitals are Temp: 97.6  F (36.4  C) Temp src: Oral BP: 105/55 Pulse: 85   Resp: 16 SpO2: 94 %.  Patient is Alert and Oriented x4. They are independent with no assistive devices .  Pt is a clears liquid diet with no caffiene.  They are denying pain, chest pain, n/v, and numbness/tingling.  Left lower leg and ankle slightly edematous, baseline per pt statement. Patient is Saline locked. Will continue to monitor and provide cares.     Please review provider order for any additional goals.   Nurse to notify provider when observation goals have been met and patient is ready for discharge.

## 2020-09-04 ENCOUNTER — DOCUMENTATION ONLY (OUTPATIENT)
Dept: OTHER | Facility: CLINIC | Age: 80
End: 2020-09-04

## 2020-12-14 ENCOUNTER — HEALTH MAINTENANCE LETTER (OUTPATIENT)
Age: 80
End: 2020-12-14

## 2021-04-18 ENCOUNTER — HEALTH MAINTENANCE LETTER (OUTPATIENT)
Age: 81
End: 2021-04-18

## 2021-06-15 ENCOUNTER — TRANSCRIBE ORDERS (OUTPATIENT)
Dept: OTHER | Age: 81
End: 2021-06-15

## 2021-06-15 DIAGNOSIS — K59.00 CONSTIPATION, UNSPECIFIED CONSTIPATION TYPE: ICD-10-CM

## 2021-06-15 DIAGNOSIS — R10.10 UPPER ABDOMINAL PAIN: ICD-10-CM

## 2021-06-15 DIAGNOSIS — R05.9 COUGH: Primary | ICD-10-CM

## 2021-06-15 DIAGNOSIS — R12 HEARTBURN: ICD-10-CM

## 2021-06-15 DIAGNOSIS — R14.0 BLOATING: ICD-10-CM

## 2021-07-01 ENCOUNTER — HOSPITAL ENCOUNTER (OUTPATIENT)
Dept: GENERAL RADIOLOGY | Facility: CLINIC | Age: 81
Discharge: HOME | End: 2021-07-01
Attending: INTERNAL MEDICINE | Admitting: INTERNAL MEDICINE
Payer: MEDICARE

## 2021-07-01 ENCOUNTER — HOSPITAL ENCOUNTER (OUTPATIENT)
Dept: SPEECH THERAPY | Facility: CLINIC | Age: 81
Setting detail: THERAPIES SERIES
End: 2021-07-01
Attending: INTERNAL MEDICINE
Payer: MEDICARE

## 2021-07-01 DIAGNOSIS — R05.9 COUGH: ICD-10-CM

## 2021-07-01 DIAGNOSIS — R12 HEARTBURN: ICD-10-CM

## 2021-07-01 DIAGNOSIS — K59.00 CONSTIPATION, UNSPECIFIED CONSTIPATION TYPE: ICD-10-CM

## 2021-07-01 DIAGNOSIS — R10.10 UPPER ABDOMINAL PAIN: ICD-10-CM

## 2021-07-01 DIAGNOSIS — R14.0 BLOATING: ICD-10-CM

## 2021-07-01 PROCEDURE — 92611 MOTION FLUOROSCOPY/SWALLOW: CPT | Mod: GN

## 2021-07-01 PROCEDURE — 74230 X-RAY XM SWLNG FUNCJ C+: CPT

## 2021-07-01 NOTE — PROGRESS NOTES
Video Fluoroscopic Swallow Study (VFSS)     07/01/21 1427   General Information   Type Of Visit Initial   Start Of Care Date 07/01/21   Referring Physician Omari Murdock MD   Orders Evaluate And Treat   Medical Diagnosis dysphagia, unspecified type (R13.10)   Onset Of Illness/injury Or Date Of Surgery 06/09/21  (date evaluation was ordered by provider)   Pertinent History of Current Problem/OT: Additional Occupational Profile Info   Referral from pt's GI MD for video fluoroscopic swallow study to assess oropharyngeal swallow function in the setting of chronic cough, which pt reports sometimes happens with eating/drinking. Her PMHx includes COPD, sleep apnia, allergic rhinitis, asthma, GERD, hiatal hernia; she uses 3 different inhalers per her report.     Pt was seen by SLP services for voice evaluation and tx in 4/2019 - at that time pt reported chronic cough for 20+ years, videostroboscopy revealed vocal fold bowing, supgraglottic hyperfunction and laryngeal irritation which resulted tight laryngeal musculature, poor respiratory and phonation coordination and mild-mod dysphonia (characterized by strained, rough, breathy vocal quality with low pitch and reduced pitch range).      Abuse Screen (yes response referral indicated)   Feels Unsafe at Home or Work/School no   Feels Threatened by Someone no   Does Anyone Try to Keep You From Having Contact with Others or Doing Things Outside Your Home? no   Physical Signs of Abuse Present no   VFSS Evaluation   VFSS Additional Documentation Yes   VFSS Eval: Radiology   Radiologist Dr. Hernandez   Views Taken left lateral;A/P   Physical Location of Procedure [facility], Radiology Dept, Fluoroscopy Suite   VFSS Eval: Thin Liquid Texture Trial   Mode of Presentation, Thin Liquid cup;straw;self-fed   Order of Presentation 1, 2, 3, 7   Preparatory Phase WFL   Oral Phase, Thin Liquid Premature pharyngeal entry  (to pyriform sinuses without  significant delay)   Pharyngeal Phase, Thin Liquid WFL   Rosenbek's Penetration Aspiration Scale: Thin Liquid Trial Results 2 - contrast enters airway, remains above the vocal cords, no residue remains (penetration)   VFSS Eval: Puree Solid Texture Trial   Mode of Presentation, Puree spoon;self-fed   Order of Presentation 4   Preparatory Phase WFL   Oral Phase, Puree Premature pharyngeal entry  (just over tip of epiglottis)   Pharyngeal Phase, Puree WFL   Rosenbek's Penetration Aspiration Scale: Puree Food Trial Results 1 - no aspiration, contrast does not enter airway   VFSS Eval: Solid Food Texture Trial   Mode of Presentation, Solid self-fed   Order of Presentation 5. 6   Preparatory Phase WFL   Oral Phase, Solid Premature pharyngeal entry  (just over tip of epiglottis)   Pharyngeal Phase, Solid WFL   Rosenbek's Penetration Aspiration Scale: Solid Food Trial Results 1 - no aspiration, contrast does not enter airway   Esophageal Phase of Swallow   Patient reports or presents with symptoms of esophageal dysphagia Yes   Esophageal sweep performed during today s vidofluoroscopic exam  Yes;Please refer to radiologist's report for details   Esophageal comments Globus sensation at mid-pharyngeal level (with no pharyngeal residuals observed) with solids per pt report therefore A/P view with upper-mid esophageal sweep completed, with no overt deficits.    Swallow Eval: Clinical Impressions   Skilled Criteria for Therapy Intervention No problems identified which require skilled intervention   Functional Assessment Scale (FAS) 6   Dysphagia Outcome Severity Scale (JENIFFER) Level 6 - JENIFFER   Treatment Diagnosis functional oropharyngeal swallow   Diet texture recommendations Regular diet;Thin liquids   Anticipated Discharge Disposition home   Clinical Impression Comments   Pt presents with a functional oropharyngeal swallow on video fluoroscopic swallow study. Good oral control and manipulation, complete mastication and oral  clearance, good base of tongue retraction, mild premature bolus spillage into the pharynx though without significant delay, functional hyolaryngeal elevation/excursion, complete epiglottic inversion, and functional upper esophageal sphincter relaxation during the swallow.     Flash penetration with cued consecutive sips of thin (functional), no other laryngeal penertaion or aspiration noted. Of note, periodic throat clears and cough x1 noted across evaluation - seen to not be r/t aspiration.     No significant oropharyngeal bolus retention. Globus sensation at mid-pharyngeal level with solids per pt report therefore A/P view with upper-mid esophageal sweep completed, with no overt deficits.     Recommendations: continue regular solids, thin liquids with general safe swallow strategies.      Total Session Time   SLP Eval: VideoFluoroscopic Swallow function Minutes (51493) 15   Total Evaluation Time 15

## 2021-10-02 ENCOUNTER — HEALTH MAINTENANCE LETTER (OUTPATIENT)
Age: 81
End: 2021-10-02

## 2022-02-17 PROBLEM — Z71.89 ADVANCE CARE PLANNING: Chronic | Status: RESOLVED | Noted: 2017-01-31 | Resolved: 2020-09-04

## 2022-05-14 ENCOUNTER — HEALTH MAINTENANCE LETTER (OUTPATIENT)
Age: 82
End: 2022-05-14

## 2022-09-03 ENCOUNTER — HEALTH MAINTENANCE LETTER (OUTPATIENT)
Age: 82
End: 2022-09-03

## 2023-06-03 ENCOUNTER — HEALTH MAINTENANCE LETTER (OUTPATIENT)
Age: 83
End: 2023-06-03

## 2024-01-23 ENCOUNTER — HOSPITAL ENCOUNTER (OUTPATIENT)
Dept: GENERAL RADIOLOGY | Facility: CLINIC | Age: 84
Discharge: HOME | End: 2024-01-23
Attending: INTERNAL MEDICINE | Admitting: INTERNAL MEDICINE
Payer: MEDICARE

## 2024-01-23 DIAGNOSIS — R05.3 CHRONIC COUGH: ICD-10-CM

## 2024-01-23 PROCEDURE — 74221 X-RAY XM ESOPHAGUS 2CNTRST: CPT

## 2024-01-23 PROCEDURE — 250N000013 HC RX MED GY IP 250 OP 250 PS 637: Performed by: INTERNAL MEDICINE

## 2024-01-23 RX ADMIN — ANTACID/ANTIFLATULENT 4 G: 380; 550; 10; 10 GRANULE, EFFERVESCENT ORAL at 08:33

## 2024-07-06 ENCOUNTER — HEALTH MAINTENANCE LETTER (OUTPATIENT)
Age: 84
End: 2024-07-06

## 2025-02-01 ENCOUNTER — NURSE TRIAGE (OUTPATIENT)
Dept: NURSING | Facility: CLINIC | Age: 85
End: 2025-02-01
Payer: MEDICARE

## 2025-02-01 ENCOUNTER — OFFICE VISIT (OUTPATIENT)
Dept: URGENT CARE | Facility: URGENT CARE | Age: 85
End: 2025-02-01
Payer: MEDICARE

## 2025-02-01 VITALS
HEART RATE: 90 BPM | TEMPERATURE: 99.6 F | WEIGHT: 147 LBS | DIASTOLIC BLOOD PRESSURE: 73 MMHG | SYSTOLIC BLOOD PRESSURE: 122 MMHG | OXYGEN SATURATION: 96 % | RESPIRATION RATE: 17 BRPM | BODY MASS INDEX: 23.02 KG/M2

## 2025-02-01 DIAGNOSIS — Z20.822 EXPOSURE TO 2019 NOVEL CORONAVIRUS: ICD-10-CM

## 2025-02-01 DIAGNOSIS — R05.1 ACUTE COUGH: Primary | ICD-10-CM

## 2025-02-01 LAB
FLUAV AG SPEC QL IA: NEGATIVE
FLUBV AG SPEC QL IA: NEGATIVE

## 2025-02-01 PROCEDURE — 87635 SARS-COV-2 COVID-19 AMP PRB: CPT | Performed by: PHYSICIAN ASSISTANT

## 2025-02-01 PROCEDURE — 99203 OFFICE O/P NEW LOW 30 MIN: CPT | Performed by: PHYSICIAN ASSISTANT

## 2025-02-01 PROCEDURE — 87804 INFLUENZA ASSAY W/OPTIC: CPT | Performed by: PHYSICIAN ASSISTANT

## 2025-02-01 RX ORDER — CODEINE PHOSPHATE AND GUAIFENESIN 10; 100 MG/5ML; MG/5ML
1 SOLUTION ORAL
Qty: 120 ML | Refills: 0 | Status: SHIPPED | OUTPATIENT
Start: 2025-02-01

## 2025-02-01 NOTE — TELEPHONE ENCOUNTER
diagnosed with covid on Thursday.  Caller feels terrible.  Home covid test was negative yesterday.  Asked if [facility] is open.  They were but closed at 1pm.  I gave her the address and hours for the [facility] urgent care in [place].  Caller stated understanding.  Belinda KESSLER RN [facility]

## 2025-02-01 NOTE — PROGRESS NOTES
Assessment & Plan     Acute cough  On exam patient is in no acute respiratory distress.  Vitals are stable.  Influenza test is negative today.  COVID PCR test is pending.  Prescription of Paxlovid will be sent to pharmacy if positive COVID test result.  Patient reported normal kidney function test last week from outside facility.  Guaifenesin with codeine prescribed as needed for cough in the interim.  Follow-up if any worsening symptoms.  Patient agrees.  - COVID-19 Virus (Coronavirus) by PCR Nose  - Influenza A & B Antigen - Clinic Collect  - guaiFENesin-codeine (ROBITUSSIN AC) 100-10 MG/5ML solution  Dispense: 120 mL; Refill: 0    Exposure to 2019 novel coronavirus  - COVID-19 Virus (Coronavirus) by PCR Nose       Return in about 5 days (around 2/6/2025) for Symptoms failing to improve.    Cassidy Alfred PA-C  [facility] IdaliaALMA ROSA Palacios is a 84 year old female who presents to clinic today for the following health issues:  Chief Complaint   Patient presents with    Urgent Care     Cough x 3 days, body ache, headache, chills, pt requesting covid test,       HPI      Patient is presenting to urgent care today with complaint of cough, body aches, headache, chills.  Onset of symptoms 2 days ago.  She notes her  tested positive for COVID 2 days ago.  Patient denies any vomiting or diarrhea.  Patient denies chest pain, shortness of breath.  Treatment tried: Motrin.      Review of Systems  Constitutional, HEENT, cardiovascular, pulmonary, GI, , musculoskeletal, neuro, skin, endocrine and psych systems are negative, except as otherwise noted.      Objective    /73   Pulse 90   Temp 99.6  F (37.6  C) (Tympanic)   Resp 17   Wt 66.7 kg (147 lb)   SpO2 96%   BMI 23.02 kg/m    Physical Exam   GENERAL: alert and no distress  HENT: ear canals and TM's normal  RESP: lungs clear to auscultation - no rales, rhonchi or wheezes  CV: regular rate and rhythm, normal S1  S2  MS: no gross musculoskeletal defects noted, no edema  SKIN: no suspicious lesions or rashes    Results for orders placed or performed in visit on 02/01/25 (from the past 24 hours)   Influenza A & B Antigen - Clinic Collect    Specimen: Nose; Swab   Result Value Ref Range    Influenza A antigen Negative Negative    Influenza B antigen Negative Negative    Narrative    Test results must be correlated with clinical data. If necessary, results should be confirmed by a molecular assay or viral culture.

## 2025-02-02 ENCOUNTER — TELEPHONE (OUTPATIENT)
Dept: URGENT CARE | Facility: URGENT CARE | Age: 85
End: 2025-02-02
Payer: MEDICARE

## 2025-02-02 DIAGNOSIS — Z20.822 SUSPECTED 2019 NOVEL CORONAVIRUS INFECTION: Primary | ICD-10-CM

## 2025-02-02 NOTE — TELEPHONE ENCOUNTER
Spoke with patient.  COVID test still pending.  Patient notes symptoms are about the same.  I have sent prescription of Paxlovid to the pharmacy for her.  If the COVID result is negative she will stop the Paxlovid.    Cassidy Alfred PA-C

## 2025-02-02 NOTE — TELEPHONE ENCOUNTER
Test Results    Contacts       Contact Date/Time Type Contact Phone/Fax    02/02/2025 03:43 PM CST Phone (Incoming) Suzanne Henderson (Self)             Who ordered the test:  [facility]    Type of test: Lab    Date of test:  02/01/25    Where was the test performed[facility]    What are your questions/concerns?:  When will she get her results and to please call with results     [facility] or would you prefer to receive a phone call?:   Patient would prefer a phone call   Okay to leave a detailed message?: Yes at Cell number on file:    Telephone Information:   Mobile [phone]

## 2025-02-03 ENCOUNTER — TELEPHONE (OUTPATIENT)
Dept: URGENT CARE | Facility: URGENT CARE | Age: 85
End: 2025-02-03
Payer: MEDICARE

## 2025-02-03 LAB — SARS-COV-2 RNA RESP QL NAA+PROBE: POSITIVE

## 2025-02-03 NOTE — TELEPHONE ENCOUNTER
Test Results    Contacts       Contact Date/Time Type Contact Phone/Fax    02/03/2025 08:54 AM CST Phone (Incoming) Suzanne Henderson (Self) [phone] (M)            Who ordered the test:  [facility] ASTUDILLO    Type of test: Lab    Date of test:  02/01/2025    Where [facility] the test performed[facility]    What are your questions/concerns?:  She has not started the Paxlovid because she wants to know if she tested positive for covid.     Could we send this information to you in [employer] or would you prefer to receive a phone call?:   Patient would prefer a phone call   Okay to leave a detailed message?: Yes at Cell number on file:    Telephone Information:   Mobile [phone]

## 2025-02-03 NOTE — TELEPHONE ENCOUNTER
Patient notified of positive Covid test result. She will start her Paxlovid.    Cassidy Alfred PA-C

## 2025-07-13 ENCOUNTER — HEALTH MAINTENANCE LETTER (OUTPATIENT)
Age: 85
End: 2025-07-13

## (undated) DEVICE — LINEN TOWEL PACK X5 5464

## (undated) DEVICE — SUCTION CANISTER MEDIVAC LINER 3000ML W/LID 65651-530

## (undated) DEVICE — TUBING SUCTION 12"X1/4" N612

## (undated) DEVICE — ESU ELEC NDL 1" E1552

## (undated) DEVICE — PACK OCULOPLATIC SEN15OCFSD

## (undated) DEVICE — SU PLAIN FAST ABSORB 6-0 PC-1 18" 1916G

## (undated) DEVICE — PACK CYSTOSCOPY SBA15CYFSI

## (undated) DEVICE — NDL ANGIOCATH 20GA 1.25" 4056

## (undated) DEVICE — ESU PENCIL W/SMOKE EVAC E2515HS

## (undated) DEVICE — EYE PREP BETADINE 5% SOLUTION 30ML 0065-0411-30

## (undated) DEVICE — ESU CORD MONOPOLAR HIGH FREQUENCY 26006M-D/10

## (undated) DEVICE — ESU GROUND PAD UNIVERSAL W/O CORD

## (undated) DEVICE — SOL WATER IRRIG 1000ML BOTTLE 2F7114

## (undated) DEVICE — GLOVE PROTEXIS W/NEU-THERA 6.5  2D73TE65

## (undated) DEVICE — PAD CHUX UNDERPAD 23X24" 7136

## (undated) DEVICE — GLOVE PROTEXIS W/NEU-THERA 8.5  2D73TE85

## (undated) DEVICE — SOL WATER IRRIG 3000ML BAG 2B7117

## (undated) DEVICE — PEN MARKING SKIN FINE 31145942

## (undated) DEVICE — BAG CYSTO TABLE DRAIN

## (undated) DEVICE — SU MERSILENE 5-0 S-24 18" 1764G

## (undated) RX ORDER — PROPOFOL 10 MG/ML
INJECTION, EMULSION INTRAVENOUS
Status: DISPENSED
Start: 2019-01-30

## (undated) RX ORDER — HYDROCODONE BITARTRATE AND ACETAMINOPHEN 5; 325 MG/1; MG/1
TABLET ORAL
Status: DISPENSED
Start: 2018-10-19

## (undated) RX ORDER — ONDANSETRON 2 MG/ML
INJECTION INTRAMUSCULAR; INTRAVENOUS
Status: DISPENSED
Start: 2018-10-19

## (undated) RX ORDER — LIDOCAINE HYDROCHLORIDE 20 MG/ML
INJECTION, SOLUTION EPIDURAL; INFILTRATION; INTRACAUDAL; PERINEURAL
Status: DISPENSED
Start: 2019-01-30

## (undated) RX ORDER — FENTANYL CITRATE 50 UG/ML
INJECTION, SOLUTION INTRAMUSCULAR; INTRAVENOUS
Status: DISPENSED
Start: 2018-10-19

## (undated) RX ORDER — DEXAMETHASONE SODIUM PHOSPHATE 4 MG/ML
INJECTION, SOLUTION INTRA-ARTICULAR; INTRALESIONAL; INTRAMUSCULAR; INTRAVENOUS; SOFT TISSUE
Status: DISPENSED
Start: 2019-01-30

## (undated) RX ORDER — CIPROFLOXACIN 2 MG/ML
INJECTION, SOLUTION INTRAVENOUS
Status: DISPENSED
Start: 2019-01-30

## (undated) RX ORDER — ONDANSETRON 2 MG/ML
INJECTION INTRAMUSCULAR; INTRAVENOUS
Status: DISPENSED
Start: 2019-01-30

## (undated) RX ORDER — HYDROCODONE BITARTRATE AND ACETAMINOPHEN 5; 325 MG/1; MG/1
TABLET ORAL
Status: DISPENSED
Start: 2019-01-30

## (undated) RX ORDER — LIDOCAINE HYDROCHLORIDE 20 MG/ML
INJECTION, SOLUTION EPIDURAL; INFILTRATION; INTRACAUDAL; PERINEURAL
Status: DISPENSED
Start: 2018-10-19

## (undated) RX ORDER — PROPOFOL 10 MG/ML
INJECTION, EMULSION INTRAVENOUS
Status: DISPENSED
Start: 2018-10-19

## (undated) RX ORDER — FENTANYL CITRATE 50 UG/ML
INJECTION, SOLUTION INTRAMUSCULAR; INTRAVENOUS
Status: DISPENSED
Start: 2019-01-30